# Patient Record
Sex: MALE | Race: WHITE | Employment: FULL TIME | URBAN - METROPOLITAN AREA
[De-identification: names, ages, dates, MRNs, and addresses within clinical notes are randomized per-mention and may not be internally consistent; named-entity substitution may affect disease eponyms.]

---

## 2020-03-26 LAB
AVERAGE GLUCOSE: 160
CHOLESTEROL, TOTAL: 303 MG/DL
CHOLESTEROL/HDL RATIO: 8.4
HBA1C MFR BLD: 7.9 %
HDLC SERPL-MCNC: 36 MG/DL (ref 35–70)
LDL CHOLESTEROL CALCULATED: 174 MG/DL (ref 0–160)
TRIGL SERPL-MCNC: 467 MG/DL
VLDLC SERPL CALC-MCNC: 93 MG/DL

## 2020-03-30 ENCOUNTER — TELEPHONE (OUTPATIENT)
Dept: DIABETES SERVICES | Age: 65
End: 2020-03-30

## 2020-03-30 NOTE — TELEPHONE ENCOUNTER
Patient called into diabetes education and requesting care. He stated he is newly diagnosis with type 2 diabetes. He is under  PCP with Dr Elvia Lares  He has [facility] and would prefer care at [address] [place] [address] as this is his place of employment and services will be coved. Writer explained program and sent fax request to Dr Elvia Lares at [facility]. At [phone]  Support materials pulled for patient who stated he will try to stop by soon.     Elia Diego, RN CDE

## 2020-04-15 ENCOUNTER — HOSPITAL ENCOUNTER (OUTPATIENT)
Dept: DIABETES SERVICES | Age: 65
Setting detail: THERAPIES SERIES
Discharge: HOME | End: 2020-04-15
Payer: COMMERCIAL

## 2020-04-15 PROCEDURE — G0108 DIAB MANAGE TRN  PER INDIV: HCPCS

## 2020-04-15 RX ORDER — BUDESONIDE AND FORMOTEROL FUMARATE DIHYDRATE 160; 4.5 UG/1; UG/1
2 AEROSOL RESPIRATORY (INHALATION) 2 TIMES DAILY
COMMUNITY

## 2020-04-15 RX ORDER — ATORVASTATIN CALCIUM 20 MG/1
20 TABLET, FILM COATED ORAL DAILY
COMMUNITY

## 2020-04-15 RX ORDER — OMEPRAZOLE 40 MG/1
40 CAPSULE, DELAYED RELEASE ORAL DAILY
COMMUNITY

## 2020-04-15 RX ORDER — ASPIRIN 81 MG/1
81 TABLET, CHEWABLE ORAL DAILY
COMMUNITY
End: 2022-05-05

## 2020-04-15 SDOH — ECONOMIC STABILITY: FOOD INSECURITY: ADDITIONAL INFORMATION: NO

## 2020-04-15 NOTE — PROGRESS NOTES
Diabetes Self- Management Education Program Assessment -   Also see Diabetic Screening  Patient, Maddie Conte,  here for diabetes self-management education  visit/ assessment. Today's visit was in an individual setting. With his wife Lokesh Correa HISTORY:  No past medical history on file. No family history on file. Patient has no allergy information on record. There is no immunization history on file for this patient. Current Medications  Current Outpatient Medications   Medication Sig Dispense Refill    metFORMIN (GLUCOPHAGE) 500 MG tablet Take 500 mg by mouth Daily Taking with PM meal      atorvastatin (LIPITOR) 20 MG tablet Take 20 mg by mouth daily      omeprazole (PRILOSEC) 40 MG delayed release capsule Take 40 mg by mouth daily      budesonide-formoterol (SYMBICORT) 160-4.5 MCG/ACT AERO Inhale 2 puffs into the lungs 2 times daily      fluticasone (VERAMYST) 27.5 MCG/SPRAY nasal spray 2 sprays by Each Nostril route daily      aspirin 81 MG chewable tablet Take 81 mg by mouth daily      sodium chloride 0.9 % SOLN 30 mL with albuterol (5 MG/ML) 0.5% NEBU Inhale 2.5 mg into the lungs daily as needed       No current facility-administered medications for this encounter.    :     Comments:  Allergies: Allergies not on file      A1C blood level - at goal < 7%   Per fax - 7.9% on March 28 2020 - PCP Dr Emilia Hill - at [facility]      Diabetes Self- Management Education Record    Participant Name: Maddie Conte  Referring Provider: Olu Simpson MD   Assessment/Evaluation Ratings:  1=Needs Instruction   4=Demonstrates Understanding/Competency  2=Needs Review   NC=Not Covered    3=Comprehends Key Points  N/A=Not Applicable  Topics/Learning Objectives Pre-session Assess Date:  4/15/20rs Instr. Date Reinforce Date Post- session Eval Comments   Diabetes disease process & Treatment process: Define diabetes & pre-diabetes;  Identify own type of diabetes; role of the pancreas; signs/symptoms; Instrx      []Pen  []Vial & Syringe      DSMS Support :   [] MNT      [] Annual update     [] Starting Fresh  adults living with diabetes or pre diabetes. [address], [place]    [id number]-0:30pm- on 6 week rotation  Free -  REGISTRATION IS REQUIRED - DEISI [phone] call for dates    []  Diabetes Group at  [place] [address] of trotter - Free 6 week diabetes education support   classes - contact : Lizy Rosa [phone]  for dates and locations      []ADA  Where do I Begin, Living with Type 2 diabetes ADA home support program  Web site: diabetes. org/living    Call: 1800 DIABETES  e-mail: [email or web address] ([employer] tool[employer])     []  Internet web sites - ADAWeb site: diabetes. org/living   D- life   [] [facility] opens at 8 30 am daily for walkers, shops open at 8 am   [address] [facility], [address] [place] [address]   [phone] Daily - 8:30am - 10am    3rd Tuesday of every month [facility] [place] expert speakers visit from 9 - 10am        [] [address] [facility], [facility], [facility], [place], [facility] reed (site rotate)  Call [phone] or visit   website: [email or web address] for more details   Check web site for updated times/ dates       S[] avvy Senior [address]  Free class   [facility]  [address] [facility], [address] [address] Tuesday and Thursday -   9 :30 am- 10:30am - ongoing   [] [address]  [address] [facility], [address] [facility] [address] [address] [facility] Thursday 11:00am - 11:45am     [] Third Wednesday Cooking  Class  Free  Registration is required     [address] [facility] [address] [facility]. [address] [facility], [address] [facility] [phone] or   Email [email or web address]   Wednesdays-5:00- 6:00 pm       [] Eat Smart  Be Active & Learn How - Free

## 2020-05-11 ENCOUNTER — CLINICAL DOCUMENTATION (OUTPATIENT)
Dept: PHARMACY | Facility: CLINIC | Age: 65
End: 2020-05-11

## 2020-05-11 ENCOUNTER — TELEPHONE (OUTPATIENT)
Dept: PHARMACY | Facility: CLINIC | Age: 65
End: 2020-05-11

## 2020-05-11 NOTE — TELEPHONE ENCOUNTER
Spoke to patient about the DM Program.  Currently missing a [employer] and a Urine Albumin. I advised him of the requirements that need to be completed before 6/01/20 for enrollment into the program on 7/01/20. Patient verified understanding and has already opened a [employer] account. He will follow-up with his provider regarding the Urine Albumin test.    Patient is requesting a [facility] covered glucometer. I discussed the Prodigy with him and he is agreeable to this meter. Patient would like to switch to the Prodigy meter.      Provider: Josephine Sapp MD  Pharmacy: [facility]  Testing: Twice a day

## 2020-05-13 ENCOUNTER — HOSPITAL ENCOUNTER (OUTPATIENT)
Age: 65
Discharge: HOME | End: 2020-05-13
Payer: COMMERCIAL

## 2020-05-13 LAB
CREATININE URINE: 89.1 MG/DL (ref 39–259)
MICROALBUMIN/CREAT 24H UR: <12 MG/L
MICROALBUMIN/CREAT UR-RTO: NORMAL MCG/MG CREAT

## 2020-05-13 PROCEDURE — 82043 UR ALBUMIN QUANTITATIVE: CPT

## 2020-05-13 PROCEDURE — 82570 ASSAY OF URINE CREATININE: CPT

## 2020-06-01 NOTE — TELEPHONE ENCOUNTER
CLINICAL PHARMACY NOTE - [address] [facility],4Th Floor Diabetes Management Program    Prepared fax to be sent to PCP's office. Will review MedImpact to see if prescriptions received tomorrow or Wednesday. If not received, will call provider's office.      Taty Gifford, PharmD, [facility], [address] [facility]  Ambulatory Clinical Care Pharmacist   [address]  [phone], Option 7

## 2020-06-04 NOTE — TELEPHONE ENCOUNTER
CLINICAL PHARMACY NOTE - [address] [facility],4Th Floor Diabetes Management Program     Outreach to PCP - no fax received. Faxed letter through [employer] - office reports they did get prescription from that fax this morning. Have been checking MedImpact this afternoon and did not see [facility] pharmacy received prescriptions, double checked by calling pharmacy and they did not have anything. Called back PCP office this afternoon and they stated they did fax to [facility] pharmacy.  Called pharmacy and left message for return call about if they received Rx or not.      Diogo Solis, PharmD, [facility], [address]  Ambulatory Clinical Care Pharmacist   [facility]  [phone], Option 7

## 2020-06-05 NOTE — TELEPHONE ENCOUNTER
CLINICAL PHARMACY NOTE -[address] [facility],4Th Floor Diabetes Management Program     Received voicemail from [facility] - they did receive prescriptions for testing supplies.  Writer left message for patient on cell phone letting him know.      Diogo Solis, PharmD, Skyla Valverde, [facility]  Ambulatory Clinical Care Pharmacist   [address]  [phone], Option 7    =======================================================    For Pharmacy Admin Tracking Only  PHSO: Yes  Total # of Interventions Recommended: 1  - Refills Provided #: 1  Recommended intervention potential cost savings: 1  Total Interventions Accepted: 1  Time Spent (min): 30

## 2020-06-08 NOTE — PROGRESS NOTES
Patient called regarding meter and [facility]HP enrollment. Appears meter is filled at pharmacy per [address] I will re email HHP enrollment to email on file.

## 2020-06-26 ENCOUNTER — HOSPITAL ENCOUNTER (OUTPATIENT)
Facility: CLINIC | Age: 65
Discharge: HOME | End: 2020-06-26
Payer: COMMERCIAL

## 2020-06-26 LAB
ALBUMIN SERPL-MCNC: 4.3 G/DL (ref 3.5–5.2)
ALBUMIN/GLOBULIN RATIO: 1.7 (ref 1–2.5)
ALP BLD-CCNC: 77 U/L (ref 40–129)
ALT SERPL-CCNC: 22 U/L (ref 5–41)
ANION GAP SERPL CALCULATED.3IONS-SCNC: 19 MMOL/L (ref 9–17)
AST SERPL-CCNC: 25 U/L
BILIRUB SERPL-MCNC: 0.29 MG/DL (ref 0.3–1.2)
BUN BLDV-MCNC: 23 MG/DL (ref 8–23)
BUN/CREAT BLD: ABNORMAL (ref 9–20)
CALCIUM SERPL-MCNC: 8.9 MG/DL (ref 8.6–10.4)
CHLORIDE BLD-SCNC: 103 MMOL/L (ref 98–107)
CHOLESTEROL/HDL RATIO: 2.8
CHOLESTEROL: 147 MG/DL
CO2: 22 MMOL/L (ref 20–31)
CREAT SERPL-MCNC: 0.91 MG/DL (ref 0.7–1.2)
GFR AFRICAN AMERICAN: >60 ML/MIN
GFR NON-AFRICAN AMERICAN: >60 ML/MIN
GFR SERPL CREATININE-BSD FRML MDRD: ABNORMAL ML/MIN/{1.73_M2}
GFR SERPL CREATININE-BSD FRML MDRD: ABNORMAL ML/MIN/{1.73_M2}
GLUCOSE BLD-MCNC: 122 MG/DL (ref 70–99)
HDLC SERPL-MCNC: 53 MG/DL
LDL CHOLESTEROL: 69 MG/DL (ref 0–130)
POTASSIUM SERPL-SCNC: 5.2 MMOL/L (ref 3.7–5.3)
SODIUM BLD-SCNC: 144 MMOL/L (ref 135–144)
TOTAL PROTEIN: 6.9 G/DL (ref 6.4–8.3)
TRIGL SERPL-MCNC: 127 MG/DL
VLDLC SERPL CALC-MCNC: NORMAL MG/DL (ref 1–30)

## 2020-06-26 PROCEDURE — 36415 COLL VENOUS BLD VENIPUNCTURE: CPT

## 2020-06-26 PROCEDURE — 80053 COMPREHEN METABOLIC PANEL: CPT

## 2020-06-26 PROCEDURE — 80061 LIPID PANEL: CPT

## 2020-06-26 PROCEDURE — 83036 HEMOGLOBIN GLYCOSYLATED A1C: CPT

## 2020-06-28 LAB
ESTIMATED AVERAGE GLUCOSE: 128 MG/DL
HBA1C MFR BLD: 6.1 % (ref 4–6)

## 2020-06-30 ENCOUNTER — CLINICAL DOCUMENTATION (OUTPATIENT)
Dept: PHARMACY | Facility: CLINIC | Age: 65
End: 2020-06-30

## 2020-07-09 ENCOUNTER — TELEPHONE (OUTPATIENT)
Dept: PHARMACY | Facility: CLINIC | Age: 65
End: 2020-07-09

## 2020-07-09 NOTE — TELEPHONE ENCOUNTER
Called patient to schedule 2020 yearly pharmacist appointment to discuss medications for Diabetes Management Program.    No answer. Left VM on home/cell TAD: Please call back at [phone] Option #7. [employer] message sent to patient.     Aisha Huff, [facility]   Department, toll free: [phone], option 7

## 2020-07-15 ENCOUNTER — SCHEDULED TELEPHONE ENCOUNTER (OUTPATIENT)
Dept: PHARMACY | Facility: CLINIC | Age: 65
End: 2020-07-15

## 2020-07-15 RX ORDER — METFORMIN HYDROCHLORIDE 500 MG/1
500 TABLET, EXTENDED RELEASE ORAL
COMMUNITY

## 2020-07-15 RX ORDER — FLUTICASONE PROPIONATE 50 MCG
1 SPRAY, SUSPENSION (ML) NASAL DAILY
COMMUNITY

## 2020-07-15 RX ORDER — ALBUTEROL SULFATE 90 UG/1
2 AEROSOL, METERED RESPIRATORY (INHALATION) EVERY 6 HOURS PRN
COMMUNITY

## 2020-07-15 NOTE — TELEPHONE ENCOUNTER
[address] [place] [address] Employee Diabetes Program  =================================================================  Uziel Petit is a 60 y.o. male enrolled in the [address] [place] [address] Employee Diabetes Program. Patient provided Denisse Jenningspepe with verbal consent to remain in the program for this year. Medications:  Current Outpatient Medications   Medication Sig Dispense Refill    albuterol sulfate HFA (VENTOLIN HFA) 108 (90 Base) MCG/ACT inhaler Inhale 2 puffs into the lungs every 6 hours as needed for Wheezing      metFORMIN (GLUCOPHAGE-XR) 500 MG extended release tablet Take 500 mg by mouth daily (with breakfast)      fluticasone (FLONASE) 50 MCG/ACT nasal spray 1 spray by Each Nostril route daily      Multiple Vitamins-Minerals (MULTIVITAMIN ADULT PO) Take by mouth qd      atorvastatin (LIPITOR) 20 MG tablet Take 20 mg by mouth daily      omeprazole (PRILOSEC) 40 MG delayed release capsule Take 40 mg by mouth daily      budesonide-formoterol (SYMBICORT) 160-4.5 MCG/ACT AERO Inhale 2 puffs into the lungs 2 times daily      aspirin 81 MG chewable tablet Take 81 mg by mouth daily       No current facility-administered medications for this visit. Current Pharmacy: [facility]  Current testing supplies/frequency: Prodigy- up to TID- information found in medimpact  Pen needles/syringes: n/a    Allergies:   Allergies not on file   Vitals/Labs:  BP Readings from Last 3 Encounters:   No data found for BP     Lab Results   Component Value Date    LABMICR CANNOT BE CALCULATED 05/13/2020     Lab Results   Component Value Date    LABA1C 6.1 (H) 06/26/2020    LABA1C 7.9 03/26/2020     Lab Results   Component Value Date    CHOL 147 06/26/2020    TRIG 127 06/26/2020    HDL 53 06/26/2020    LDLCHOLESTEROL 69 06/26/2020    LDLCALC 174 (A) 03/26/2020     ALT   Date Value Ref Range Status   06/26/2020 22 5 - 41 U/L Final     AST   Date Value Ref Range Status   06/26/2020 25 <40 U/L Final     The ASCVD Risk score year): yes  - Daily Aspirin? Yes  - Medication compliance: compliant all of the time  - Diet compliance: compliant all of the time  - Current exercise: Has started biking.  - Patient reported that after his A1c in March, he stared using Metformin XR 500mg and working on his diet. He states that he has lost 30 lbs. I applauded his effort and encouraged him to keep it up. He states that he is feeling much better. He has had both hips and a knee replaced and states that he is no longer experiencing pain. - Patient has recently been set up with HHP and knows how to get refills. He now has the correct testing supplies and is aware he will have to call [facility]HP for refills on these also. PLAN:  - Consideration(s) for provider:   · None at the moment. - DM program gaps identified:   · Initial requirements: Requirements met   · Ongoing requirements: Influenza vaccination for 5140-9116 and Medication adherence over 70%   - Education to patient: Discussed general issues about diabetes pathophysiology and management. Encouraged aerobic exercise. Discussed foot care. Reminded to get yearly retinal exam.   - Follow up: PCP for identified gaps or as scheduled below     W. Delcia Krabbe, PharmD  Angela Torres [address]  Phone: [phone] option-7      For Pharmacy Admin Tracking Only    PHSO: Yes  Total # of Interventions Recommended: 1  - Updated Order #: 1 Updated Order Reason(s):  Other  Recommended intervention potential cost savings: 1  Total Interventions Accepted: 1  Time Spent (min): 45

## 2020-07-22 ENCOUNTER — CLINICAL DOCUMENTATION (OUTPATIENT)
Dept: PHARMACY | Facility: CLINIC | Age: 65
End: 2020-07-22

## 2020-10-05 ENCOUNTER — CLINICAL DOCUMENTATION (OUTPATIENT)
Dept: PHARMACY | Facility: CLINIC | Age: 65
End: 2020-10-05

## 2020-12-02 ENCOUNTER — NURSE TRIAGE (OUTPATIENT)
Dept: OTHER | Facility: CLINIC | Age: 65
End: 2020-12-02

## 2020-12-02 ENCOUNTER — HOSPITAL ENCOUNTER (OUTPATIENT)
Age: 65
Discharge: HOME | End: 2020-12-02
Payer: COMMERCIAL

## 2020-12-02 PROCEDURE — U0003 INFECTIOUS AGENT DETECTION BY NUCLEIC ACID (DNA OR RNA); SEVERE ACUTE RESPIRATORY SYNDROME CORONAVIRUS 2 (SARS-COV-2) (CORONAVIRUS DISEASE [COVID-19]), AMPLIFIED PROBE TECHNIQUE, MAKING USE OF HIGH THROUGHPUT TECHNOLOGIES AS DESCRIBED BY CMS-2020-01-R: HCPCS

## 2020-12-02 NOTE — TELEPHONE ENCOUNTER
Reason for Disposition   [1] HIGH RISK patient (e.g., age > 59 years, diabetes, heart or lung disease, weak immune system) AND [2] new or worsening symptoms    Answer Assessment - Initial Assessment Questions  1. COVID-19 DIAGNOSIS: \"Who made your Coronavirus (COVID-19) diagnosis? \" \"Was it confirmed by a positive lab test?\" If not diagnosed by a HCP, ask \"Are there lots of cases (community spread) where you live? \" (See public health department website, if unsure)      n/a  2. COVID-19 EXPOSURE: \"Was there any known exposure to COVID before the symptoms began? \" CDC Definition of close contact: within 6 feet (2 meters) for a total of 15 minutes or more over a 24-hour period. Yes - works in the hospital  3. ONSET: \"When did the COVID-19 symptoms start? \"       Onset was a couple of weeks ago  4. WORST SYMPTOM: \"What is your worst symptom? \" (e.g., cough, fever, shortness of breath, muscle aches)      Fatigue (wants to sleep all the time)  5. COUGH: \"Do you have a cough? \" If so, ask: \"How bad is the cough? \"        No rest  6. FEVER: \"Do you have a fever? \" If so, ask: \"What is your temperature, how was it measured, and when did it start? \"      No fever  7. RESPIRATORY STATUS: \"Describe your breathing? \" (e.g., shortness of breath, wheezing, unable to speak)       Has COPD (no abnormal breathing)  8. BETTER-SAME-WORSE: Jorge Greening you getting better, staying the same or getting worse compared to yesterday? \"  If getting worse, ask, \"In what way? \"      same  9. HIGH RISK DISEASE: \"Do you have any chronic medical problems? \" (e.g., asthma, heart or lung disease, weak immune system, obesity, etc.)      Diabetic, COPD, Asthmatic  10. PREGNANCY: \"Is there any chance you are pregnant? \" \"When was your last menstrual period? \"        No   11. OTHER SYMPTOMS: \"Do you have any other symptoms? \"  (e.g., chills, fatigue, headache, loss of smell or taste, muscle pain, sore throat; new loss of smell or taste especially support the diagnosis of COVID-19)        Fatigue (profound), loss of appetite, profuse diaphoresis, headache, possible diarrhea (takes metformin)    Protocols used: CORONAVIRUS (COVID-19) DIAGNOSED OR SUSPECTED-ADULT-AH    Patient / employee reports mild covid like symptoms: fatigue, no appetite, headache and diaphoresis. Patient denies fever, cough, or shortness of breath. Recommended patient reach out to his provider due to co-morbidities (Diabetic, COPD, Asthmatic). Provided care advice. Provided occupational health care provider and associate health nurse information and encouraged patient to make appropriate calls to begin the next steps. Instructed employee/patient to stay in close contact with her direct supervisor throughout the return to work process.

## 2020-12-03 LAB
SARS-COV-2, RAPID: NORMAL
SARS-COV-2: NORMAL
SARS-COV-2: NOT DETECTED
SOURCE: NORMAL

## 2021-01-14 ENCOUNTER — TELEPHONE (OUTPATIENT)
Dept: PHARMACY | Facility: CLINIC | Age: 66
End: 2021-01-14

## 2021-01-21 NOTE — TELEPHONE ENCOUNTER
Called patient to schedule yearly pharmacist appointment to discuss medications for Diabetes Management Program.     Spoke to patient and appointment scheduled for 1/27/21 at 3:30pm.       Giovany Cabrera, [facility] [employer] [facility]   Department, toll free: [phone], option 7

## 2021-01-27 ENCOUNTER — SCHEDULED TELEPHONE ENCOUNTER (OUTPATIENT)
Dept: PHARMACY | Facility: CLINIC | Age: 66
End: 2021-01-27

## 2021-01-27 RX ORDER — IBUPROFEN 200 MG
800 TABLET ORAL EVERY 6 HOURS PRN
Status: ON HOLD | COMMUNITY
End: 2021-07-29 | Stop reason: HOSPADM

## 2021-01-27 RX ORDER — MULTIVIT WITH MINERALS/LUTEIN
1 TABLET ORAL DAILY
Status: ON HOLD | COMMUNITY
End: 2022-05-12 | Stop reason: HOSPADM

## 2021-01-27 RX ORDER — BLOOD-GLUCOSE METER
EACH MISCELLANEOUS
COMMUNITY

## 2021-01-27 NOTE — TELEPHONE ENCOUNTER
[facility] CLINICAL PHARMACY REVIEW - Be Well with Diabetes    Tracey Bennett is a 63 y.o. male enrolled in the [facility],4Th Floor Employee Diabetes Program. Patient provided [address] with verbal consent to remain in the program for this year. Medications:  Current Outpatient Medications   Medication Sig Dispense Refill    ibuprofen (ADVIL;MOTRIN) 200 MG tablet Take 800 mg by mouth every 6 hours as needed for Pain      Multiple Vitamins-Minerals (CENTRUM SILVER) TABS Take 1 tablet by mouth daily      Blood Glucose Monitoring Suppl (PRODIGY AUTOCODE BLOOD GLUCOSE) w/Device KIT Use as directed to test blood sugar daily      albuterol sulfate HFA (VENTOLIN HFA) 108 (90 Base) MCG/ACT inhaler Inhale 2 puffs into the lungs every 6 hours as needed for Wheezing      metFORMIN (GLUCOPHAGE-XR) 500 MG extended release tablet Take 500 mg by mouth Daily with supper       fluticasone (FLONASE) 50 MCG/ACT nasal spray 1 spray by Each Nostril route daily      atorvastatin (LIPITOR) 20 MG tablet Take 20 mg by mouth daily      omeprazole (PRILOSEC) 40 MG delayed release capsule Take 40 mg by mouth daily      budesonide-formoterol (SYMBICORT) 160-4.5 MCG/ACT AERO Inhale 2 puffs into the lungs 2 times daily      aspirin 81 MG chewable tablet Take 81 mg by mouth daily       No current facility-administered medications for this visit. Current Pharmacy: [facility], [facility]  Current testing supplies/frequency: Prodigy every morning     Allergies: Allergen Reactions    Meperidine       Vitals/Labs:  BP Readings from Last 3 Encounters:   No data found for BP      Ref. Range 5/13/2020 15:15   Creatinine, Ur Latest Ref Range: 39.0 - 259.0 mg/dL 89.1   Microalb, Ur Latest Ref Range: <21 mg/L <12   Microalb/Crt.  Ratio Latest Ref Range: <17 mcg/mg creat CANNOT BE CALCULATED     Hemoglobin A1c   Component Value Date    LABA1C 6.1 (H) 06/26/2020    LABA1C 7.9 03/26/2020     Lipids   Component Value Date    CHOL 147 06/26/2020    TRIG 127 06/26/2020    HDL 53 06/26/2020    LDLCHOLESTEROL 69 06/26/2020     ALT   Date Value Ref Range Status   06/26/2020 22 5 - 41 U/L Final     AST   Date Value Ref Range Status   06/26/2020 25 <40 U/L Final     The ASCVD Risk score (Jimena Man., et al., 2013) failed to calculate for the following reasons: The systolic blood pressure is missing    The smoking status is invalid    Unable to determine if patient is Non-      Renal Function   Component Value Date    CREATININE 0.91 06/26/2020   eGFR: >60 mL/min/1.73 m^2    Immunizations:  Administered Date(s) Administered    Hepatitis B Ped/Adol (Engerix-B, Recombivax HB) 03/08/1989, 04/10/1989, 09/29/1989    Influenza Virus Vaccine 10/22/2012, 10/06/2014, 10/06/2014, 10/13/2020    PPD Test 03/26/2014, 04/01/2014    Pneumococcal Polysaccharide (Agyveaudx05) 03/15/2018    Tdap (Boostrix, Adacel) 03/26/2014    Zoster Recombinant (Shingrix) 03/13/2020      Social History:  Tobacco Use    Smoking status: Not on file   Substance Use Topics    Alcohol use: Not on file     ASSESSMENT:  Initial Program Requirements (Y indicates has completed for the year, N indicates needs to be completed by 07/01/2021): No - Provider Visit for DM (1st)  No - A1c (1st)     Ongoing Program Requirements (Y indicates has completed for the year, N indicates needs to be completed by 12/31/2021):   No - Provider Visit for DM (2nd)  No - ACC/diabetes educator visit (if A1c over 8%) - not currently needed  No - A1c (2nd)  No - Lipid panel  No - Urine microalbumin  Yes - Pneumococcal vaccination: UTD, will need in 2023  No - Influenza vaccination for Fall 2021  No - Medication adherence over 70%  Yes - On statin or contraindication(s) - atorvastatin  Yes - On ACEi/ARB or contraindication(s) Normal blood pressure, urinary albumin-to-creatinine ratio, and eGFR - May 2020 at [employer] /54 (CareEverywhere)    Current medications eligible for copay waiver, up to $600, through [address]:  - metformin ER, atorvastatin, aspirin   - Prodigy glucometer and testing supplies      Diabetes Care:   - Glycemic Goal: <7.0%. Is at blood glucose goal. Type 2 DM under excellent control as evidenced by A1c = 6.1%.  - Current symptoms/problems include none  - Home blood sugar records:  have been a little elevated recently  - Any episodes of hypoglycemia? no   - Daily aspirin? Yes   - Medication compliance: compliant most of the time  - Diet compliance: has been eating more carbs, working on monitoring - lost 30lbs between March and June and has kept off  - Exercise: arthritis/knee pain limits, takes ibuprofen - discussed trying mobic or celebrex for longer acting coverage    Other Considerations:  - Blood Pressure Goal: BP less than 140/90 mmHg due to history of DM: Unable to assess if at BP target. - Lipids: Patient is prescribed moderate-intensity statin therapy. - Smoking status: Did not discuss    PLAN:  - DM program gaps identified:   · Initial requirements: Provider Visit for DM (1st) and A1c (1st)   · Ongoing requirements: Provider visit for DM (2nd), A1c (2nd), Lipid panel, Urine microalbumin, Influenza vaccination for 8332-2697 and Medication adherence over 70%   - Education to patient: Addressed medication adherence, Overview of Be Well With Diabetes program and Overview of HHP   - Follow up: PCP for identified gaps or as scheduled below    Nena Schneider, PharmD, [facility], [address] [place] [address], [address] [place] [address] Pharmacist   [address] [facility]  [phone], Option 7    =======================================================    For Pharmacy Admin Tracking Only  PHSO: Yes  Total # of Interventions Recommended: 1  - Updated Order #: 1 Updated Order Reason(s):  Other  Recommended intervention potential cost savings: 1  Total Interventions Accepted: 1  Time Spent (min): 60

## 2021-01-27 NOTE — TELEPHONE ENCOUNTER
[place] Sim[place] REVIEW - BE WELL WITH DIABETES    Vadim Verona is a 63 y.o. male enrolled in the [address] [place] [address] Diabetes Program. Two attempts made to reach patient by telephone for pharmacist appointment. Left voice message for patient to return clinician's phone call to [phone] and/or [phone] option 7. Will prepare MyChart message and send to patient.     Vadim Lizama, PharmD, [facility], [facility]  Ambulatory Clinical Care Pharmacist   [address] [facility]  [phone], Option 7

## 2021-02-15 ENCOUNTER — CLINICAL DOCUMENTATION (OUTPATIENT)
Dept: PHARMACY | Facility: CLINIC | Age: 66
End: 2021-02-15

## 2021-02-15 ENCOUNTER — HOSPITAL ENCOUNTER (OUTPATIENT)
Age: 66
Discharge: HOME | End: 2021-02-15
Payer: COMMERCIAL

## 2021-02-15 LAB
ALBUMIN SERPL-MCNC: 4.5 G/DL (ref 3.5–5.2)
ALBUMIN/GLOBULIN RATIO: 1.6 (ref 1–2.5)
ALP BLD-CCNC: 81 U/L (ref 40–129)
ALT SERPL-CCNC: 20 U/L (ref 5–41)
ANION GAP SERPL CALCULATED.3IONS-SCNC: 11 MMOL/L (ref 9–17)
AST SERPL-CCNC: 21 U/L
BILIRUB SERPL-MCNC: 0.38 MG/DL (ref 0.3–1.2)
BUN BLDV-MCNC: 15 MG/DL (ref 8–23)
BUN/CREAT BLD: ABNORMAL (ref 9–20)
CALCIUM SERPL-MCNC: 9.6 MG/DL (ref 8.6–10.4)
CHLORIDE BLD-SCNC: 103 MMOL/L (ref 98–107)
CHOLESTEROL, FASTING: 201 MG/DL
CHOLESTEROL/HDL RATIO: 3.6
CO2: 26 MMOL/L (ref 20–31)
CREAT SERPL-MCNC: 0.97 MG/DL (ref 0.7–1.2)
CREATININE URINE: 156.8 MG/DL (ref 39–259)
ESTIMATED AVERAGE GLUCOSE: 134 MG/DL
GFR AFRICAN AMERICAN: >60 ML/MIN
GFR NON-AFRICAN AMERICAN: >60 ML/MIN
GFR SERPL CREATININE-BSD FRML MDRD: ABNORMAL ML/MIN/{1.73_M2}
GFR SERPL CREATININE-BSD FRML MDRD: ABNORMAL ML/MIN/{1.73_M2}
GLUCOSE BLD-MCNC: 145 MG/DL (ref 70–99)
HBA1C MFR BLD: 6.3 % (ref 4–6)
HDLC SERPL-MCNC: 56 MG/DL
LDL CHOLESTEROL: 117 MG/DL (ref 0–130)
MICROALBUMIN/CREAT 24H UR: <12 MG/L
MICROALBUMIN/CREAT UR-RTO: NORMAL MCG/MG CREAT
POTASSIUM SERPL-SCNC: 3.9 MMOL/L (ref 3.7–5.3)
SODIUM BLD-SCNC: 140 MMOL/L (ref 135–144)
TOTAL PROTEIN: 7.3 G/DL (ref 6.4–8.3)
TRIGLYCERIDE, FASTING: 139 MG/DL
VLDLC SERPL CALC-MCNC: ABNORMAL MG/DL (ref 1–30)

## 2021-02-15 PROCEDURE — 80061 LIPID PANEL: CPT

## 2021-02-15 PROCEDURE — 82570 ASSAY OF URINE CREATININE: CPT

## 2021-02-15 PROCEDURE — 83036 HEMOGLOBIN GLYCOSYLATED A1C: CPT

## 2021-02-15 PROCEDURE — 82043 UR ALBUMIN QUANTITATIVE: CPT

## 2021-02-15 PROCEDURE — 80053 COMPREHEN METABOLIC PANEL: CPT

## 2021-02-15 PROCEDURE — 36415 COLL VENOUS BLD VENIPUNCTURE: CPT

## 2021-02-15 NOTE — PROGRESS NOTES
Pharmacy Pop Care Documentation:     AVS received for required office visit on: 2/12/21: Jo-Ann Benson

## 2021-04-19 ENCOUNTER — HOSPITAL ENCOUNTER (OUTPATIENT)
Age: 66
Discharge: HOME | End: 2021-04-21
Payer: COMMERCIAL

## 2021-04-19 ENCOUNTER — HOSPITAL ENCOUNTER (OUTPATIENT)
Dept: GENERAL RADIOLOGY | Age: 66
Discharge: HOME | End: 2021-04-21
Payer: COMMERCIAL

## 2021-04-19 DIAGNOSIS — S49.91XS INJURY OF RIGHT SHOULDER, SEQUELA: ICD-10-CM

## 2021-04-19 PROCEDURE — 73030 X-RAY EXAM OF SHOULDER: CPT

## 2021-07-14 ENCOUNTER — HOSPITAL ENCOUNTER (OUTPATIENT)
Dept: MRI IMAGING | Facility: CLINIC | Age: 66
Discharge: HOME | End: 2021-07-16
Payer: COMMERCIAL

## 2021-07-14 DIAGNOSIS — S49.91XA INJURY OF RIGHT UPPER ARM, INITIAL ENCOUNTER: ICD-10-CM

## 2021-07-14 PROCEDURE — 73221 MRI JOINT UPR EXTREM W/O DYE: CPT

## 2021-07-21 ENCOUNTER — HOSPITAL ENCOUNTER (OUTPATIENT)
Dept: PREADMISSION TESTING | Age: 66
Discharge: HOME | End: 2021-07-25
Payer: COMMERCIAL

## 2021-07-21 VITALS
HEART RATE: 62 BPM | RESPIRATION RATE: 16 BRPM | DIASTOLIC BLOOD PRESSURE: 98 MMHG | TEMPERATURE: 98.1 F | HEIGHT: 71 IN | BODY MASS INDEX: 30.1 KG/M2 | OXYGEN SATURATION: 99 % | WEIGHT: 215 LBS | SYSTOLIC BLOOD PRESSURE: 138 MMHG

## 2021-07-21 LAB
ANION GAP SERPL CALCULATED.3IONS-SCNC: 11 MMOL/L (ref 9–17)
BUN BLDV-MCNC: 25 MG/DL (ref 8–23)
BUN/CREAT BLD: 25 (ref 9–20)
CALCIUM SERPL-MCNC: 9.9 MG/DL (ref 8.6–10.4)
CHLORIDE BLD-SCNC: 100 MMOL/L (ref 98–107)
CO2: 25 MMOL/L (ref 20–31)
CREAT SERPL-MCNC: 1.01 MG/DL (ref 0.7–1.2)
ESTIMATED AVERAGE GLUCOSE: 128 MG/DL
GFR AFRICAN AMERICAN: >60 ML/MIN
GFR NON-AFRICAN AMERICAN: >60 ML/MIN
GFR SERPL CREATININE-BSD FRML MDRD: ABNORMAL ML/MIN/{1.73_M2}
GFR SERPL CREATININE-BSD FRML MDRD: ABNORMAL ML/MIN/{1.73_M2}
GLUCOSE BLD-MCNC: 91 MG/DL (ref 70–99)
HBA1C MFR BLD: 6.1 % (ref 4–6)
HCT VFR BLD CALC: 43.6 % (ref 40.7–50.3)
HEMOGLOBIN: 13.4 G/DL (ref 13–17)
MCH RBC QN AUTO: 26.3 PG (ref 25.2–33.5)
MCHC RBC AUTO-ENTMCNC: 30.7 G/DL (ref 28.4–34.8)
MCV RBC AUTO: 85.5 FL (ref 82.6–102.9)
NRBC AUTOMATED: 0 PER 100 WBC
PDW BLD-RTO: 13.5 % (ref 11.8–14.4)
PLATELET # BLD: 207 K/UL (ref 138–453)
PMV BLD AUTO: 9.8 FL (ref 8.1–13.5)
POTASSIUM SERPL-SCNC: 4.1 MMOL/L (ref 3.7–5.3)
RBC # BLD: 5.1 M/UL (ref 4.21–5.77)
SODIUM BLD-SCNC: 136 MMOL/L (ref 135–144)
WBC # BLD: 7 K/UL (ref 3.5–11.3)

## 2021-07-21 PROCEDURE — 85027 COMPLETE CBC AUTOMATED: CPT

## 2021-07-21 PROCEDURE — 80048 BASIC METABOLIC PNL TOTAL CA: CPT

## 2021-07-21 PROCEDURE — 93005 ELECTROCARDIOGRAM TRACING: CPT | Performed by: ORTHOPAEDIC SURGERY

## 2021-07-21 PROCEDURE — 36415 COLL VENOUS BLD VENIPUNCTURE: CPT

## 2021-07-21 PROCEDURE — 83036 HEMOGLOBIN GLYCOSYLATED A1C: CPT

## 2021-07-21 RX ORDER — CETIRIZINE HYDROCHLORIDE 10 MG/1
10 TABLET ORAL DAILY
COMMUNITY

## 2021-07-21 NOTE — H&P (VIEW-ONLY)
History and Physical Service   [facility]    HISTORY AND PHYSICAL EXAMINATION            Date of Evaluation: 7/21/2021  Patient name:  Himanshu Parker  MRN:   [id number]  YOB: 1955  PCP:    Isaac Lowry MD    History Obtained From:     Patient, medical records    History of Present Illness: This is Himanshu Parker a 63 y.o. male who presents for a pre-admission testing appointment for an upcoming right shoulder arthroscopy rotator cuff repair acromioplasty, DCE, open subpec biceps tenodesis, suprascapular nerve block by Dr. Yovani Reece scheduled on 7/29/2021 at 1500 due to right rotator cuff tear. The patient's chief complaint is 0-7/10 left shoulder pain that has progressively worsened over the past 4 months. Patient reached to catch something suddenly and felt a snap in the left shoulder in April 2021. Left shoulder pain is aggravated by sudden movement and extending arm outward or above the shoulder and is minimally relieved with Extra strength tylenol and ice. Prior treatment includes injection (with 2 weeks relief) and exercises. Denies recent falls and injuries. Functional Capacity per pt:   1) Pt is able to walk 2 city blocks on level ground without SOB. 2) Pt is able to climb 2 flights of stairs without SOB. 3) Pt is able to walk up a hill for 1-2 city blocks without SOB.     Past Medical History:     Past Medical History:   Diagnosis Date    Arthritis     Asthma     COPD (chronic obstructive pulmonary disease) ([address]     Diabetes mellitus (HCC)     type 2    GERD (gastroesophageal reflux disease)     Hyperlipidemia         Past Surgical History:     Past Surgical History:   Procedure Laterality Date    ANTERIOR CRUCIATE LIGAMENT REPAIR Right     APPENDECTOMY      BICEPS TENDON REPAIR Right     CARDIAC SURGERY      cardiac cath negative     COLONOSCOPY      polyp removed negative     EYE SURGERY      lasik    HERNIA REPAIR      bilat at 16    JOINT history. Review of Systems:     Positive and Negative as described in HPI. CONSTITUTIONAL:  Negative for fevers, chills, sweats, fatigue, and weight loss. HEENT: Wears glasses. Tinnitus. Negative for hearing changes, rhinorrhea, and throat pain. RESPIRATORY: COPD. Occasional shortness breath. Negative for cough, congestion, and wheezing. CARDIOVASCULAR:  Negative for chest pain, blood clot, irregular heartbeat, and palpitations. GASTROINTESTINAL: GERD - managed with Omeprazole Negative for nausea, vomiting, diarrhea, constipation, change in bowel habits, and abdominal pain. GENITOURINARY: Weak urine stream, difficulty starting urine stream Negative for difficulty of urination, burning with urination, and frequency. INTEGUMENT:  Negative for rash, skin lesions, and easy bruising. HEMATOLOGIC/LYMPHATIC:  Negative for swelling/edema. ALLERGIC/IMMUNOLOGIC:  Negative for urticaria and itching. ENDOCRINE: Diabetes. Managed by Dr. Donald Wagner. Last A1C 6.3 (2/15/2021). Negative for increase in thirst, increase in urination, and heat or cold intolerance. MUSCULOSKELETAL: See HPI. Bilateral total hip replacement. ACL deficient right knee. Negative muscle aches, and swelling of joints. NEUROLOGICAL: Negative for headaches, dizziness, lightheadedness, numbness, and tingling extremities. BEHAVIOR/PSYCH:  Negative for depression and anxiety. Physical Exam:   BP (!) 138/98   Pulse 62   Temp 98.1 °F (36.7 °C) (Temporal)   Resp 16   Ht 5' 11\" (1.803 m)   Wt 215 lb (97.5 kg)   SpO2 99%   BMI 29.99 kg/m²   No results for input(s): POCGLU in the last 72 hours. General Appearance:  Alert, well appearing, and in no acute distress. Mental status:  Oriented to person, place, and time. Head:  Normocephalic and atraumatic. Eye:  No icterus, redness, pupils equal and reactive, extraocular eye movements intact, and conjunctiva clear. Ear:  Hearing grossly intact. Nose:  No drainage noted.   Mouth:  Mucous membranes moist.  Neck:  Supple and no carotid bruits noted. Lungs:  Bilateral equal air entry, clear to auscultation, no wheezing, rales or rhonchi, and normal effort. Cardiovascular:  Normal rate, regular rhythm, no murmur, gallop, or rub. Abdomen:  Soft, nontender, nondistended, and active bowel sounds. Neurologic:  Normal speech and cranial nerves II through XII grossly intact. Strength 5/5 bilaterally. Skin:  No gross lesions, rashes, bruising, or bleeding on exposed skin area. Extremities: Limited range of motion left shoulder. Posterior tibial pulses 2+ bilaterally. No pedal edema. No calf tenderness with palpation. Psych: Normal affect. Investigations:      Laboratory Testing:  Recent Results (from the past 24 hour(s))   EKG 12 Lead    Collection Time: 07/21/21  2:08 PM   Result Value Ref Range    Ventricular Rate 57 BPM    Atrial Rate 57 BPM    P-R Interval 182 ms    QRS Duration 92 ms    Q-T Interval 400 ms    QTc Calculation (Bazett) 389 ms    P Axis 67 degrees    R Axis 10 degrees    T Axis 42 degrees   CBC    Collection Time: 07/21/21  2:33 PM   Result Value Ref Range    WBC 7.0 3.5 - 11.3 k/uL    RBC 5.10 4.21 - 5.77 m/uL    Hemoglobin 13.4 13.0 - 17.0 g/dL    Hematocrit 43.6 40.7 - 50.3 %    MCV 85.5 82.6 - 102.9 fL    MCH 26.3 25.2 - 33.5 pg    MCHC 30.7 28.4 - 34.8 g/dL    RDW 13.5 11.8 - 14.4 %    Platelets 620 484 - 889 k/uL    MPV 9.8 8.1 - 13.5 fL    NRBC Automated 0.0 0.0 per 100 WBC       Recent Labs     07/21/21  1433   HGB 13.4   HCT 43.6   WBC 7.0   MCV 85.5       No results for input(s): COVID19 in the last 720 hours.     Imaging/Diagnostics:    MRI SHOULDER RIGHT WO CONTRAST    Result Date: 7/14/2021  EXAMINATION: MRI OF THE RIGHT SHOULDER WITHOUT CONTRAST   7/14/2021 3:04 pm TECHNIQUE: Multiplanar multisequence MRI of the right shoulder was performed without the administration of intravenous contrast. COMPARISON: Right shoulder plain radiographs from 04/19/2021 HISTORY: ORDERING SYSTEM PROVIDED HISTORY: Injury of right upper arm, initial encounter TECHNOLOGIST PROVIDED HISTORY: Reason for Exam: REACHING FOR SOMETHING & FELT A POP/ PULL/ PAIN IN RIGHT SHOULDER X2 MONTHS. LIMITED ROM. CLICKING NOISES. TIGHTNESS IN THE JOINT. Acuity: Acute Type of Exam: Unknown 61-year-old female with right shoulder pain and limited range of motion FINDINGS: ROTATOR CUFF: Subacromial subdeltoid bursa is contiguous with the glenohumeral joint. Complete retracted full-thickness tear of supraspinatus with retraction of the torn fibers from the footplate measuring up to 3.7 cm on image 9, series 8. Up to 50% partial-thickness articular-surface and interstitial tearing of infraspinatus between musculotendinous junction and footplate. Moderate underlying infraspinatus tendinopathy. Low-grade partial-thickness split interstitial tearing of the insertional fibers of subscapularis. Teres minor muscle/tendon appears grossly intact without evidence of tearing. No significant atrophy or fatty degeneration of the visualized rotator cuff musculature. BICEPS TENDON: Interstitial tearing and severe thinning of the intra-articular long head of biceps tendon which demonstrates medial subluxation into the region of split interstitial subscapularis tearing. LABRUM: Degenerative tearing of the superior labrum. Mild underlying labral degeneration. GLENOHUMERAL JOINT: Inferior glenohumeral ligament appears intact. Small glenohumeral joint effusion. Mild glenohumeral chondromalacia. AC JOINT AND ACROMIOCLAVICULAR ARCH: Mild degenerative changes of the right AC joint. Type 2 acromion. BONE MARROW: Subcortical cystic changes at the lateral humeral head. Bone marrow signal intensity within the visualized osseous structures otherwise within normal limits. No acute fracture or dislocation involving the osseous components of the shoulder.  OUTLET SPACES: Suprascapular notch and quadrilateral space grossly unremarkable in appearance. No right axillary lymphadenopathy. 1. Complete retracted full-thickness tear of supraspinatus with retraction of the torn fibers measuring up to 3.7 cm from the footplate. 2. Up to 50% partial-thickness articular-surface and interstitial tearing of infraspinatus between musculotendinous junction and footplate. Moderate underlying infraspinatus tendinosis. 3. Low-grade partial-thickness split interstitial tearing of the insertional fibers of subscapularis. 4. Interstitial tearing and severe thinning of the intra-articular long head of biceps tendon with medial subluxation into the region of split interstitial subscapularis tearing. 5. Degenerative tearing of the superior labrum. Mild underlying labral degeneration. 6. Small glenohumeral joint effusion. Mild glenohumeral chondromalacia. 7. Mild degenerative changes of the right AC joint. EK2021: Sinus bradycardia. Otherwise normal ECG. See Epic. Diagnosis:      1. Right rotator cuff tear    Plans:     1.  Right shoulder arthroscopy rotator cuff repair acromioplasty, dce, open subpec biceps tenodesis - suprascapular nerve block      Sarita Blue, YUNIEL - CNP  2021  2:52 PM

## 2021-07-21 NOTE — PRE-PROCEDURE INSTRUCTIONS
physician. You will need to shower at home the night before surgery and the morning of surgery with a special soap called chlorhexidine gluconate (CHG*). *Not to be used by people allergic to Chlorhexidine Gluconate (CHG). Following these instructions will help you be sure that your skin is clean before surgery. Instructions on cleaning your skin before surgery: The night before your surgery:      You will need to shower with warm water (not hot) and the CHG soap.  Use a clean wash cloth and a clean towel. Have clean clothes available to put on after the shower.   First wash your hair with regular shampoo. Rinse your hair and body thoroughly to remove the shampoo.  Wash your face and genital area (private parts) with your regular soap or water only. Thoroughly rinse your body with warm water from the neck down.  Turn water off to prevent rinsing the soap off too soon.  With a clean wet washcloth and half of the CHG soap in the bottle, lather your entire body from the neck down. Do not use CHG soap near your eyes or ears to avoid injury to those areas.  Wash thoroughly, paying special attention to the area where your surgery will be performed.  Wash your body gently for five (5) minutes. Avoid scrubbing your skin too hard.  Turn the water back on and rinse your body thoroughly.  Pat yourself dry with a clean, soft towel. Do not apply lotion, cream or powder.  Dress with clean freshly washed clothes. The morning of surgery:     Repeat shower following steps above - using remaining half of CHG soap in bottle. Patient Instructions:    [facility] If you are having any type of anesthesia you are to have nothing to eat or drink after midnight the night before your surgery.   This includes gum, hard candy, mints, water or smoking or chewing tobacco.  The only exception to this is a small sip of water to take with any morning dose of heart, blood pressure, or seizure medications. No alcoholic beverages for 24 hours prior to surgery.  Brush your teeth but do not swallow water.  Bring your eye glasses and case with you. No contacts are to be worn the day of surgery. You also may bring your hearing aids. Most surgical procedures involving anesthesia will require that you remove your dentures prior to surgery. · Do not wear any jewelry or body piercings day of surgery. Also, NO lotion, perfume or deodorant to be used the day of surgery. No nail polish on the operative extremity (arm/leg surgeries)    · If you are staying overnight with us, please bring a small bag of necessary personal items.  Please wear loose, comfortable clothing. If you are potentially going to have a cast or brace bring clothing that will fit over them.  In case of illness - If you have cold or flu like symptoms (high fever, runny nose, sore throat, cough, etc.) rash, nausea, vomiting, loose stools, and/or recent contact with someone who has a contagious disease (chicken pox, measles, etc.) Please call your doctor before coming to the hospital.         Day of Surgery/Procedure:    As a patient at [facility] - INPATIENT you can expect quality medical and nursing care that is centered on your individual needs. Our goal is to make your surgical experience as comfortable as possible    . Transportation After Your Surgery/Procedure: You will need a friend or family member to drive you home after your procedure. Your  must be 25years of age or older and able to sign off on your discharge instructions. A taxi cab or any other form of public transportation is not acceptable. Your friend or family member must stay at the hospital throughout your procedure.     Someone must remain with you for the first 24 hours after your surgery if you receive anesthesia or medication. If you do not have someone to stay with you, your procedure may be cancelled.       If you have any other questions regarding your procedure or the day of surgery, please call [phone]      _________________________  ____________________________  Signature (Patient)              Signature (Provider)               Date

## 2021-07-21 NOTE — H&P
History and Physical Service   [place] [address]    HISTORY AND PHYSICAL EXAMINATION            Date of Evaluation: 7/21/2021  Patient name:  Torres Castaneda  MRN:   [id number]  YOB: 1955  PCP:    Jack Jones MD    History Obtained From:     Patient, medical records    History of Present Illness: This is Torres Castaneda a 63 y.o. male who presents for a pre-admission testing appointment for an upcoming right shoulder arthroscopy rotator cuff repair acromioplasty, DCE, open subpec biceps tenodesis, suprascapular nerve block by Dr. Karmen Márquez scheduled on 7/29/2021 at 1500 due to right rotator cuff tear. The patient's chief complaint is 0-7/10 left shoulder pain that has progressively worsened over the past 4 months. Patient reached to catch something suddenly and felt a snap in the left shoulder in April 2021. Left shoulder pain is aggravated by sudden movement and extending arm outward or above the shoulder and is minimally relieved with Extra strength tylenol and ice. Prior treatment includes injection (with 2 weeks relief) and exercises. Denies recent falls and injuries. Functional Capacity per pt:   1) Pt is able to walk 2 city blocks on level ground without SOB. 2) Pt is able to climb 2 flights of stairs without SOB. 3) Pt is able to walk up a hill for 1-2 city blocks without SOB.     Past Medical History:     Past Medical History:   Diagnosis Date    Arthritis     Asthma     COPD (chronic obstructive pulmonary disease) ([facility] [address]     Diabetes mellitus (HCC)     type 2    GERD (gastroesophageal reflux disease)     Hyperlipidemia         Past Surgical History:     Past Surgical History:   Procedure Laterality Date    ANTERIOR CRUCIATE LIGAMENT REPAIR Right     APPENDECTOMY      BICEPS TENDON REPAIR Right     CARDIAC SURGERY      cardiac cath negative     COLONOSCOPY      polyp removed negative     EYE SURGERY      lasik    HERNIA REPAIR      bilat at 16    JOINT REPLACEMENT      bilat hips     TONSILLECTOMY          Medications Prior to Admission:     Prior to Admission medications    Medication Sig Start Date End Date Taking? Authorizing Provider   Diclofenac Sodium (VOLTAREN PO) Take 75 mg by mouth 2 times daily   Yes Historical Provider, MD   cetirizine (ZYRTEC) 10 MG tablet Take 10 mg by mouth daily   Yes Historical Provider, MD   ibuprofen (ADVIL;MOTRIN) 200 MG tablet Take 800 mg by mouth every 6 hours as needed for Pain   Yes Historical Provider, MD   Multiple Vitamins-Minerals (CENTRUM SILVER) TABS Take 1 tablet by mouth daily   Yes Historical Provider, MD   albuterol sulfate HFA (VENTOLIN HFA) 108 (90 Base) MCG/ACT inhaler Inhale 2 puffs into the lungs every 6 hours as needed for Wheezing   Yes Historical Provider, MD   metFORMIN (GLUCOPHAGE-XR) 500 MG extended release tablet Take 500 mg by mouth Daily with supper    Yes Historical Provider, MD   fluticasone (FLONASE) 50 MCG/ACT nasal spray 1 spray by Each Nostril route daily   Yes Historical Provider, MD   atorvastatin (LIPITOR) 20 MG tablet Take 20 mg by mouth daily   Yes Historical Provider, MD   omeprazole (PRILOSEC) 40 MG delayed release capsule Take 40 mg by mouth daily   Yes Historical Provider, MD   budesonide-formoterol (SYMBICORT) 160-4.5 MCG/ACT AERO Inhale 2 puffs into the lungs 2 times daily   Yes Historical Provider, MD   aspirin 81 MG chewable tablet Take 81 mg by mouth daily   Yes Historical Provider, MD   Blood Glucose Monitoring Suppl (PRODIGY AUTOCODE BLOOD GLUCOSE) w/Device KIT Use as directed to test blood sugar daily    Historical Provider, MD        Allergies:     Meperidine    Social History:     Tobacco:    reports that he quit smoking about 21 years ago. His smoking use included cigarettes. He has never used smokeless tobacco.  Alcohol:      reports current alcohol use. Drug Use:  reports no history of drug use. Family History:     History reviewed.  No pertinent family history. Review of Systems:     Positive and Negative as described in HPI. CONSTITUTIONAL:  Negative for fevers, chills, sweats, fatigue, and weight loss. HEENT: Wears glasses. Tinnitus. Negative for hearing changes, rhinorrhea, and throat pain. RESPIRATORY: COPD. Occasional shortness breath. Negative for cough, congestion, and wheezing. CARDIOVASCULAR:  Negative for chest pain, blood clot, irregular heartbeat, and palpitations. GASTROINTESTINAL: GERD - managed with Omeprazole Negative for nausea, vomiting, diarrhea, constipation, change in bowel habits, and abdominal pain. GENITOURINARY: Weak urine stream, difficulty starting urine stream Negative for difficulty of urination, burning with urination, and frequency. INTEGUMENT:  Negative for rash, skin lesions, and easy bruising. HEMATOLOGIC/LYMPHATIC:  Negative for swelling/edema. ALLERGIC/IMMUNOLOGIC:  Negative for urticaria and itching. ENDOCRINE: Diabetes. Managed by Dr. Lasha Rivero. Last A1C 6.3 (2/15/2021). Negative for increase in thirst, increase in urination, and heat or cold intolerance. MUSCULOSKELETAL: See HPI. Bilateral total hip replacement. ACL deficient right knee. Negative muscle aches, and swelling of joints. NEUROLOGICAL: Negative for headaches, dizziness, lightheadedness, numbness, and tingling extremities. BEHAVIOR/PSYCH:  Negative for depression and anxiety. Physical Exam:   BP (!) 138/98   Pulse 62   Temp 98.1 °F (36.7 °C) (Temporal)   Resp 16   Ht 5' 11\" (1.803 m)   Wt 215 lb (97.5 kg)   SpO2 99%   BMI 29.99 kg/m²   No results for input(s): POCGLU in the last 72 hours. General Appearance:  Alert, well appearing, and in no acute distress. Mental status:  Oriented to person, place, and time. Head:  Normocephalic and atraumatic. Eye:  No icterus, redness, pupils equal and reactive, extraocular eye movements intact, and conjunctiva clear. Ear:  Hearing grossly intact. Nose:  No drainage noted.   Mouth:  Mucous membranes moist.  Neck:  Supple and no carotid bruits noted. Lungs:  Bilateral equal air entry, clear to auscultation, no wheezing, rales or rhonchi, and normal effort. Cardiovascular:  Normal rate, regular rhythm, no murmur, gallop, or rub. Abdomen:  Soft, nontender, nondistended, and active bowel sounds. Neurologic:  Normal speech and cranial nerves II through XII grossly intact. Strength 5/5 bilaterally. Skin:  No gross lesions, rashes, bruising, or bleeding on exposed skin area. Extremities: Limited range of motion left shoulder. Posterior tibial pulses 2+ bilaterally. No pedal edema. No calf tenderness with palpation. Psych: Normal affect. Investigations:      Laboratory Testing:  Recent Results (from the past 24 hour(s))   EKG 12 Lead    Collection Time: 07/21/21  2:08 PM   Result Value Ref Range    Ventricular Rate 57 BPM    Atrial Rate 57 BPM    P-R Interval 182 ms    QRS Duration 92 ms    Q-T Interval 400 ms    QTc Calculation (Bazett) 389 ms    P Axis 67 degrees    R Axis 10 degrees    T Axis 42 degrees   CBC    Collection Time: 07/21/21  2:33 PM   Result Value Ref Range    WBC 7.0 3.5 - 11.3 k/uL    RBC 5.10 4.21 - 5.77 m/uL    Hemoglobin 13.4 13.0 - 17.0 g/dL    Hematocrit 43.6 40.7 - 50.3 %    MCV 85.5 82.6 - 102.9 fL    MCH 26.3 25.2 - 33.5 pg    MCHC 30.7 28.4 - 34.8 g/dL    RDW 13.5 11.8 - 14.4 %    Platelets 320 339 - 214 k/uL    MPV 9.8 8.1 - 13.5 fL    NRBC Automated 0.0 0.0 per 100 WBC       Recent Labs     07/21/21  1433   HGB 13.4   HCT 43.6   WBC 7.0   MCV 85.5       No results for input(s): COVID19 in the last 720 hours.     Imaging/Diagnostics:    MRI SHOULDER RIGHT WO CONTRAST    Result Date: 7/14/2021  EXAMINATION: MRI OF THE RIGHT SHOULDER WITHOUT CONTRAST   7/14/2021 3:04 pm TECHNIQUE: Multiplanar multisequence MRI of the right shoulder was performed without the administration of intravenous contrast. COMPARISON: Right shoulder plain radiographs from 04/19/2021 HISTORY: ORDERING SYSTEM PROVIDED HISTORY: Injury of right upper arm, initial encounter TECHNOLOGIST PROVIDED HISTORY: Reason for Exam: REACHING FOR SOMETHING & FELT A POP/ PULL/ PAIN IN RIGHT SHOULDER X2 MONTHS. LIMITED ROM. CLICKING NOISES. TIGHTNESS IN THE JOINT. Acuity: Acute Type of Exam: Unknown 60-year-old female with right shoulder pain and limited range of motion FINDINGS: ROTATOR CUFF: Subacromial subdeltoid bursa is contiguous with the glenohumeral joint. Complete retracted full-thickness tear of supraspinatus with retraction of the torn fibers from the footplate measuring up to 3.7 cm on image 9, series 8. Up to 50% partial-thickness articular-surface and interstitial tearing of infraspinatus between musculotendinous junction and footplate. Moderate underlying infraspinatus tendinopathy. Low-grade partial-thickness split interstitial tearing of the insertional fibers of subscapularis. Teres minor muscle/tendon appears grossly intact without evidence of tearing. No significant atrophy or fatty degeneration of the visualized rotator cuff musculature. BICEPS TENDON: Interstitial tearing and severe thinning of the intra-articular long head of biceps tendon which demonstrates medial subluxation into the region of split interstitial subscapularis tearing. LABRUM: Degenerative tearing of the superior labrum. Mild underlying labral degeneration. GLENOHUMERAL JOINT: Inferior glenohumeral ligament appears intact. Small glenohumeral joint effusion. Mild glenohumeral chondromalacia. AC JOINT AND ACROMIOCLAVICULAR ARCH: Mild degenerative changes of the right AC joint. Type 2 acromion. BONE MARROW: Subcortical cystic changes at the lateral humeral head. Bone marrow signal intensity within the visualized osseous structures otherwise within normal limits. No acute fracture or dislocation involving the osseous components of the shoulder.  OUTLET SPACES: Suprascapular notch and quadrilateral space grossly unremarkable in appearance. No right axillary lymphadenopathy. 1. Complete retracted full-thickness tear of supraspinatus with retraction of the torn fibers measuring up to 3.7 cm from the footplate. 2. Up to 50% partial-thickness articular-surface and interstitial tearing of infraspinatus between musculotendinous junction and footplate. Moderate underlying infraspinatus tendinosis. 3. Low-grade partial-thickness split interstitial tearing of the insertional fibers of subscapularis. 4. Interstitial tearing and severe thinning of the intra-articular long head of biceps tendon with medial subluxation into the region of split interstitial subscapularis tearing. 5. Degenerative tearing of the superior labrum. Mild underlying labral degeneration. 6. Small glenohumeral joint effusion. Mild glenohumeral chondromalacia. 7. Mild degenerative changes of the right AC joint. EK2021: Sinus bradycardia. Otherwise normal ECG. See Epic. Diagnosis:      1. Right rotator cuff tear    Plans:     1.  Right shoulder arthroscopy rotator cuff repair acromioplasty, dce, open subpec biceps tenodesis - suprascapular nerve block      Sarita Blue, YUNIEL - CNP  2021  2:52 PM

## 2021-07-22 LAB
EKG ATRIAL RATE: 57 BPM
EKG P AXIS: 67 DEGREES
EKG P-R INTERVAL: 182 MS
EKG Q-T INTERVAL: 400 MS
EKG QRS DURATION: 92 MS
EKG QTC CALCULATION (BAZETT): 389 MS
EKG R AXIS: 10 DEGREES
EKG T AXIS: 42 DEGREES
EKG VENTRICULAR RATE: 57 BPM

## 2021-07-29 ENCOUNTER — HOSPITAL ENCOUNTER (OUTPATIENT)
Age: 66
Setting detail: OUTPATIENT SURGERY
Discharge: HOME | End: 2021-07-29
Attending: ORTHOPAEDIC SURGERY | Admitting: ORTHOPAEDIC SURGERY
Payer: COMMERCIAL

## 2021-07-29 ENCOUNTER — ANESTHESIA (OUTPATIENT)
Dept: OPERATING ROOM | Age: 66
End: 2021-07-29
Payer: COMMERCIAL

## 2021-07-29 ENCOUNTER — ANESTHESIA EVENT (OUTPATIENT)
Dept: OPERATING ROOM | Age: 66
End: 2021-07-29
Payer: COMMERCIAL

## 2021-07-29 VITALS
DIASTOLIC BLOOD PRESSURE: 77 MMHG | BODY MASS INDEX: 30.1 KG/M2 | SYSTOLIC BLOOD PRESSURE: 134 MMHG | RESPIRATION RATE: 20 BRPM | OXYGEN SATURATION: 95 % | TEMPERATURE: 97.2 F | HEIGHT: 71 IN | HEART RATE: 64 BPM | WEIGHT: 215 LBS

## 2021-07-29 VITALS — SYSTOLIC BLOOD PRESSURE: 161 MMHG | OXYGEN SATURATION: 95 % | DIASTOLIC BLOOD PRESSURE: 107 MMHG | TEMPERATURE: 78.1 F

## 2021-07-29 DIAGNOSIS — G89.18 ACUTE POSTOPERATIVE PAIN: Primary | ICD-10-CM

## 2021-07-29 LAB
GLUCOSE BLD-MCNC: 160 MG/DL (ref 75–110)
GLUCOSE BLD-MCNC: 80 MG/DL (ref 75–110)

## 2021-07-29 PROCEDURE — 2500000003 HC RX 250 WO HCPCS: Performed by: NURSE ANESTHETIST, CERTIFIED REGISTERED

## 2021-07-29 PROCEDURE — 7100000011 HC PHASE II RECOVERY - ADDTL 15 MIN: Performed by: ORTHOPAEDIC SURGERY

## 2021-07-29 PROCEDURE — 7100000001 HC PACU RECOVERY - ADDTL 15 MIN: Performed by: ORTHOPAEDIC SURGERY

## 2021-07-29 PROCEDURE — 3600000003 HC SURGERY LEVEL 3 BASE: Performed by: ORTHOPAEDIC SURGERY

## 2021-07-29 PROCEDURE — 7100000010 HC PHASE II RECOVERY - FIRST 15 MIN: Performed by: ORTHOPAEDIC SURGERY

## 2021-07-29 PROCEDURE — 6360000002 HC RX W HCPCS: Performed by: NURSE ANESTHETIST, CERTIFIED REGISTERED

## 2021-07-29 PROCEDURE — 2720000010 HC SURG SUPPLY STERILE: Performed by: ORTHOPAEDIC SURGERY

## 2021-07-29 PROCEDURE — 82947 ASSAY GLUCOSE BLOOD QUANT: CPT

## 2021-07-29 PROCEDURE — 3600000013 HC SURGERY LEVEL 3 ADDTL 15MIN: Performed by: ORTHOPAEDIC SURGERY

## 2021-07-29 PROCEDURE — 2580000003 HC RX 258: Performed by: ORTHOPAEDIC SURGERY

## 2021-07-29 PROCEDURE — 2580000003 HC RX 258: Performed by: ANESTHESIOLOGY

## 2021-07-29 PROCEDURE — 2580000003 HC RX 258: Performed by: NURSE ANESTHETIST, CERTIFIED REGISTERED

## 2021-07-29 PROCEDURE — 3700000001 HC ADD 15 MINUTES (ANESTHESIA): Performed by: ORTHOPAEDIC SURGERY

## 2021-07-29 PROCEDURE — 6360000002 HC RX W HCPCS: Performed by: ORTHOPAEDIC SURGERY

## 2021-07-29 PROCEDURE — 6360000002 HC RX W HCPCS: Performed by: ANESTHESIOLOGY

## 2021-07-29 PROCEDURE — 6360000002 HC RX W HCPCS

## 2021-07-29 PROCEDURE — 7100000000 HC PACU RECOVERY - FIRST 15 MIN: Performed by: ORTHOPAEDIC SURGERY

## 2021-07-29 PROCEDURE — C9290 INJ, BUPIVACAINE LIPOSOME: HCPCS | Performed by: ORTHOPAEDIC SURGERY

## 2021-07-29 PROCEDURE — C1713 ANCHOR/SCREW BN/BN,TIS/BN: HCPCS | Performed by: ORTHOPAEDIC SURGERY

## 2021-07-29 PROCEDURE — 3700000000 HC ANESTHESIA ATTENDED CARE: Performed by: ORTHOPAEDIC SURGERY

## 2021-07-29 PROCEDURE — 6370000000 HC RX 637 (ALT 250 FOR IP): Performed by: ANESTHESIOLOGY

## 2021-07-29 PROCEDURE — 2709999900 HC NON-CHARGEABLE SUPPLY: Performed by: ORTHOPAEDIC SURGERY

## 2021-07-29 PROCEDURE — 2500000003 HC RX 250 WO HCPCS: Performed by: ORTHOPAEDIC SURGERY

## 2021-07-29 DEVICE — HEALICOIL SA PK 5.5MM W/2 UB-BL                                    CBRD BL
Type: IMPLANTABLE DEVICE | Site: SHOULDER | Status: FUNCTIONAL
Brand: HEALICOIL / ULTRABRAID

## 2021-07-29 DEVICE — HEALICOIL PK 5.5 MM SUTURE ANCHOR                                    WITH THREE ULTRABRAID NO.2 SUTURES                                    BLUE, BLUE-COBRAID, COBRAID-BLACK STERILE
Type: IMPLANTABLE DEVICE | Site: SHOULDER | Status: FUNCTIONAL
Brand: HEALICOIL

## 2021-07-29 DEVICE — 1.8MM Q-FIX ALL SUTURE ANCHOR
Type: IMPLANTABLE DEVICE | Site: SHOULDER | Status: FUNCTIONAL
Brand: Q-FIX

## 2021-07-29 RX ORDER — ONDANSETRON 2 MG/ML
4 INJECTION INTRAMUSCULAR; INTRAVENOUS
Status: COMPLETED | OUTPATIENT
Start: 2021-07-29 | End: 2021-07-29

## 2021-07-29 RX ORDER — HYDROMORPHONE HYDROCHLORIDE 1 MG/ML
0.5 INJECTION, SOLUTION INTRAMUSCULAR; INTRAVENOUS; SUBCUTANEOUS EVERY 5 MIN PRN
Status: DISCONTINUED | OUTPATIENT
Start: 2021-07-29 | End: 2021-07-29 | Stop reason: HOSPADM

## 2021-07-29 RX ORDER — DOCUSATE SODIUM 100 MG/1
100 CAPSULE, LIQUID FILLED ORAL 2 TIMES DAILY
Qty: 30 CAPSULE | Refills: 0 | Status: SHIPPED | OUTPATIENT
Start: 2021-07-29 | End: 2021-07-29 | Stop reason: SDUPTHER

## 2021-07-29 RX ORDER — FENTANYL CITRATE 50 UG/ML
INJECTION, SOLUTION INTRAMUSCULAR; INTRAVENOUS PRN
Status: DISCONTINUED | OUTPATIENT
Start: 2021-07-29 | End: 2021-07-29 | Stop reason: SDUPTHER

## 2021-07-29 RX ORDER — ONDANSETRON 2 MG/ML
INJECTION INTRAMUSCULAR; INTRAVENOUS PRN
Status: DISCONTINUED | OUTPATIENT
Start: 2021-07-29 | End: 2021-07-29 | Stop reason: SDUPTHER

## 2021-07-29 RX ORDER — GLYCOPYRROLATE 1 MG/5 ML
SYRINGE (ML) INTRAVENOUS PRN
Status: DISCONTINUED | OUTPATIENT
Start: 2021-07-29 | End: 2021-07-29 | Stop reason: SDUPTHER

## 2021-07-29 RX ORDER — SODIUM CHLORIDE, SODIUM LACTATE, POTASSIUM CHLORIDE, CALCIUM CHLORIDE 600; 310; 30; 20 MG/100ML; MG/100ML; MG/100ML; MG/100ML
INJECTION, SOLUTION INTRAVENOUS CONTINUOUS PRN
Status: DISCONTINUED | OUTPATIENT
Start: 2021-07-29 | End: 2021-07-29 | Stop reason: SDUPTHER

## 2021-07-29 RX ORDER — CEFAZOLIN SODIUM 1 G/3ML
INJECTION, POWDER, FOR SOLUTION INTRAMUSCULAR; INTRAVENOUS PRN
Status: DISCONTINUED | OUTPATIENT
Start: 2021-07-29 | End: 2021-07-29 | Stop reason: SDUPTHER

## 2021-07-29 RX ORDER — LIDOCAINE HYDROCHLORIDE 20 MG/ML
INJECTION, SOLUTION EPIDURAL; INFILTRATION; INTRACAUDAL; PERINEURAL PRN
Status: DISCONTINUED | OUTPATIENT
Start: 2021-07-29 | End: 2021-07-29 | Stop reason: SDUPTHER

## 2021-07-29 RX ORDER — BUPIVACAINE HYDROCHLORIDE AND EPINEPHRINE 2.5; 5 MG/ML; UG/ML
INJECTION, SOLUTION EPIDURAL; INFILTRATION; INTRACAUDAL; PERINEURAL
Status: DISCONTINUED
Start: 2021-07-29 | End: 2021-07-29 | Stop reason: HOSPADM

## 2021-07-29 RX ORDER — FENTANYL CITRATE 50 UG/ML
25 INJECTION, SOLUTION INTRAMUSCULAR; INTRAVENOUS EVERY 5 MIN PRN
Status: DISCONTINUED | OUTPATIENT
Start: 2021-07-29 | End: 2021-07-29 | Stop reason: HOSPADM

## 2021-07-29 RX ORDER — PROMETHAZINE HYDROCHLORIDE 25 MG/ML
6.25 INJECTION, SOLUTION INTRAMUSCULAR; INTRAVENOUS
Status: DISCONTINUED | OUTPATIENT
Start: 2021-07-29 | End: 2021-07-29 | Stop reason: HOSPADM

## 2021-07-29 RX ORDER — LABETALOL HYDROCHLORIDE 5 MG/ML
INJECTION, SOLUTION INTRAVENOUS PRN
Status: DISCONTINUED | OUTPATIENT
Start: 2021-07-29 | End: 2021-07-29 | Stop reason: SDUPTHER

## 2021-07-29 RX ORDER — SEVOFLURANE 250 ML/250ML
LIQUID RESPIRATORY (INHALATION)
Status: DISCONTINUED
Start: 2021-07-29 | End: 2021-07-29 | Stop reason: HOSPADM

## 2021-07-29 RX ORDER — OXYCODONE HYDROCHLORIDE AND ACETAMINOPHEN 5; 325 MG/1; MG/1
2 TABLET ORAL PRN
Status: DISCONTINUED | OUTPATIENT
Start: 2021-07-29 | End: 2021-07-29 | Stop reason: HOSPADM

## 2021-07-29 RX ORDER — SODIUM CHLORIDE, SODIUM LACTATE, POTASSIUM CHLORIDE, CALCIUM CHLORIDE 600; 310; 30; 20 MG/100ML; MG/100ML; MG/100ML; MG/100ML
INJECTION, SOLUTION INTRAVENOUS CONTINUOUS
Status: DISCONTINUED | OUTPATIENT
Start: 2021-07-30 | End: 2021-07-29 | Stop reason: HOSPADM

## 2021-07-29 RX ORDER — KETOROLAC TROMETHAMINE 30 MG/ML
INJECTION, SOLUTION INTRAMUSCULAR; INTRAVENOUS PRN
Status: DISCONTINUED | OUTPATIENT
Start: 2021-07-29 | End: 2021-07-29 | Stop reason: SDUPTHER

## 2021-07-29 RX ORDER — LABETALOL HYDROCHLORIDE 5 MG/ML
5 INJECTION, SOLUTION INTRAVENOUS EVERY 10 MIN PRN
Status: DISCONTINUED | OUTPATIENT
Start: 2021-07-29 | End: 2021-07-29 | Stop reason: HOSPADM

## 2021-07-29 RX ORDER — ONDANSETRON 4 MG/1
4 TABLET, FILM COATED ORAL EVERY 6 HOURS PRN
Qty: 30 TABLET | Refills: 1 | Status: SHIPPED | OUTPATIENT
Start: 2021-07-29 | End: 2022-05-05

## 2021-07-29 RX ORDER — ROCURONIUM BROMIDE 10 MG/ML
INJECTION, SOLUTION INTRAVENOUS PRN
Status: DISCONTINUED | OUTPATIENT
Start: 2021-07-29 | End: 2021-07-29 | Stop reason: SDUPTHER

## 2021-07-29 RX ORDER — PROPOFOL 10 MG/ML
INJECTION, EMULSION INTRAVENOUS PRN
Status: DISCONTINUED | OUTPATIENT
Start: 2021-07-29 | End: 2021-07-29 | Stop reason: SDUPTHER

## 2021-07-29 RX ORDER — OXYCODONE HYDROCHLORIDE AND ACETAMINOPHEN 5; 325 MG/1; MG/1
1-2 TABLET ORAL EVERY 4 HOURS PRN
Qty: 50 TABLET | Refills: 0 | Status: SHIPPED | OUTPATIENT
Start: 2021-07-29 | End: 2021-08-05

## 2021-07-29 RX ORDER — TRANEXAMIC ACID 100 MG/ML
INJECTION, SOLUTION INTRAVENOUS
Status: COMPLETED
Start: 2021-07-29 | End: 2021-07-29

## 2021-07-29 RX ORDER — SODIUM CHLORIDE 9 MG/ML
INJECTION INTRAVENOUS
Status: DISCONTINUED
Start: 2021-07-29 | End: 2021-07-29 | Stop reason: HOSPADM

## 2021-07-29 RX ORDER — TRANEXAMIC ACID 100 MG/ML
INJECTION, SOLUTION INTRAVENOUS PRN
Status: DISCONTINUED | OUTPATIENT
Start: 2021-07-29 | End: 2021-07-29 | Stop reason: SDUPTHER

## 2021-07-29 RX ORDER — NEOSTIGMINE METHYLSULFATE 5 MG/5 ML
SYRINGE (ML) INTRAVENOUS PRN
Status: DISCONTINUED | OUTPATIENT
Start: 2021-07-29 | End: 2021-07-29 | Stop reason: SDUPTHER

## 2021-07-29 RX ORDER — LIDOCAINE HYDROCHLORIDE 10 MG/ML
1 INJECTION, SOLUTION EPIDURAL; INFILTRATION; INTRACAUDAL; PERINEURAL
Status: DISCONTINUED | OUTPATIENT
Start: 2021-07-30 | End: 2021-07-29 | Stop reason: HOSPADM

## 2021-07-29 RX ORDER — ACETAMINOPHEN 500 MG
1000 TABLET ORAL ONCE
Status: COMPLETED | OUTPATIENT
Start: 2021-07-29 | End: 2021-07-29

## 2021-07-29 RX ORDER — OXYCODONE HYDROCHLORIDE AND ACETAMINOPHEN 5; 325 MG/1; MG/1
1 TABLET ORAL PRN
Status: DISCONTINUED | OUTPATIENT
Start: 2021-07-29 | End: 2021-07-29 | Stop reason: HOSPADM

## 2021-07-29 RX ORDER — MIDAZOLAM HYDROCHLORIDE 1 MG/ML
INJECTION INTRAMUSCULAR; INTRAVENOUS PRN
Status: DISCONTINUED | OUTPATIENT
Start: 2021-07-29 | End: 2021-07-29 | Stop reason: SDUPTHER

## 2021-07-29 RX ORDER — METOCLOPRAMIDE HYDROCHLORIDE 5 MG/ML
INJECTION INTRAMUSCULAR; INTRAVENOUS
Status: COMPLETED
Start: 2021-07-29 | End: 2021-07-29

## 2021-07-29 RX ORDER — METOCLOPRAMIDE HYDROCHLORIDE 5 MG/ML
10 INJECTION INTRAMUSCULAR; INTRAVENOUS ONCE
Status: COMPLETED | OUTPATIENT
Start: 2021-07-29 | End: 2021-07-29

## 2021-07-29 RX ORDER — DOCUSATE SODIUM 100 MG/1
100 CAPSULE, LIQUID FILLED ORAL 2 TIMES DAILY
Qty: 30 CAPSULE | Refills: 0 | Status: SHIPPED | OUTPATIENT
Start: 2021-07-29 | End: 2022-05-05

## 2021-07-29 RX ORDER — HYDRALAZINE HYDROCHLORIDE 20 MG/ML
5 INJECTION INTRAMUSCULAR; INTRAVENOUS EVERY 10 MIN PRN
Status: DISCONTINUED | OUTPATIENT
Start: 2021-07-29 | End: 2021-07-29 | Stop reason: HOSPADM

## 2021-07-29 RX ADMIN — CEFAZOLIN 2000 MG: 1 INJECTION, POWDER, FOR SOLUTION INTRAMUSCULAR; INTRAVENOUS at 15:10

## 2021-07-29 RX ADMIN — SODIUM CHLORIDE, POTASSIUM CHLORIDE, SODIUM LACTATE AND CALCIUM CHLORIDE: 600; 310; 30; 20 INJECTION, SOLUTION INTRAVENOUS at 16:08

## 2021-07-29 RX ADMIN — FENTANYL CITRATE 100 MCG: 50 INJECTION, SOLUTION INTRAMUSCULAR; INTRAVENOUS at 15:20

## 2021-07-29 RX ADMIN — PROPOFOL 200 MG: 10 INJECTION, EMULSION INTRAVENOUS at 14:51

## 2021-07-29 RX ADMIN — LABETALOL HYDROCHLORIDE 5 MG: 5 INJECTION, SOLUTION INTRAVENOUS at 16:01

## 2021-07-29 RX ADMIN — ONDANSETRON 4 MG: 2 INJECTION INTRAMUSCULAR; INTRAVENOUS at 18:04

## 2021-07-29 RX ADMIN — MIDAZOLAM 2 MG: 1 INJECTION INTRAMUSCULAR; INTRAVENOUS at 14:46

## 2021-07-29 RX ADMIN — SODIUM CHLORIDE, POTASSIUM CHLORIDE, SODIUM LACTATE AND CALCIUM CHLORIDE: 600; 310; 30; 20 INJECTION, SOLUTION INTRAVENOUS at 14:46

## 2021-07-29 RX ADMIN — FENTANYL CITRATE 25 MCG: 50 INJECTION, SOLUTION INTRAMUSCULAR; INTRAVENOUS at 18:20

## 2021-07-29 RX ADMIN — PROPOFOL 50 MG: 10 INJECTION, EMULSION INTRAVENOUS at 15:27

## 2021-07-29 RX ADMIN — ROCURONIUM BROMIDE 50 MG: 10 INJECTION, SOLUTION INTRAVENOUS at 14:51

## 2021-07-29 RX ADMIN — FENTANYL CITRATE 25 MCG: 50 INJECTION, SOLUTION INTRAMUSCULAR; INTRAVENOUS at 18:13

## 2021-07-29 RX ADMIN — LIDOCAINE HYDROCHLORIDE 60 MG: 20 INJECTION, SOLUTION EPIDURAL; INFILTRATION; INTRACAUDAL; PERINEURAL at 14:51

## 2021-07-29 RX ADMIN — FENTANYL CITRATE 50 MCG: 50 INJECTION, SOLUTION INTRAMUSCULAR; INTRAVENOUS at 14:46

## 2021-07-29 RX ADMIN — METOCLOPRAMIDE HYDROCHLORIDE 10 MG: 5 INJECTION INTRAMUSCULAR; INTRAVENOUS at 18:42

## 2021-07-29 RX ADMIN — KETOROLAC TROMETHAMINE 30 MG: 30 INJECTION, SOLUTION INTRAMUSCULAR; INTRAVENOUS at 17:14

## 2021-07-29 RX ADMIN — LABETALOL HYDROCHLORIDE 5 MG: 5 INJECTION, SOLUTION INTRAVENOUS at 15:27

## 2021-07-29 RX ADMIN — FENTANYL CITRATE 100 MCG: 50 INJECTION, SOLUTION INTRAMUSCULAR; INTRAVENOUS at 14:51

## 2021-07-29 RX ADMIN — SODIUM CHLORIDE, POTASSIUM CHLORIDE, SODIUM LACTATE AND CALCIUM CHLORIDE: 600; 310; 30; 20 INJECTION, SOLUTION INTRAVENOUS at 13:36

## 2021-07-29 RX ADMIN — ACETAMINOPHEN 1000 MG: 500 TABLET ORAL at 14:44

## 2021-07-29 RX ADMIN — HYDROMORPHONE HYDROCHLORIDE 0.5 MG: 1 INJECTION, SOLUTION INTRAMUSCULAR; INTRAVENOUS; SUBCUTANEOUS at 18:02

## 2021-07-29 RX ADMIN — ONDANSETRON 4 MG: 2 INJECTION, SOLUTION INTRAMUSCULAR; INTRAVENOUS at 17:15

## 2021-07-29 RX ADMIN — TRANEXAMIC ACID 2000 MG: 100 INJECTION, SOLUTION INTRAVENOUS at 15:20

## 2021-07-29 RX ADMIN — FENTANYL CITRATE 50 MCG: 50 INJECTION, SOLUTION INTRAMUSCULAR; INTRAVENOUS at 16:19

## 2021-07-29 RX ADMIN — HYDROMORPHONE HYDROCHLORIDE 0.5 MG: 1 INJECTION, SOLUTION INTRAMUSCULAR; INTRAVENOUS; SUBCUTANEOUS at 17:52

## 2021-07-29 RX ADMIN — Medication 3 MG: at 17:29

## 2021-07-29 RX ADMIN — Medication 0.3 MG: at 15:19

## 2021-07-29 RX ADMIN — FENTANYL CITRATE 50 MCG: 50 INJECTION, SOLUTION INTRAMUSCULAR; INTRAVENOUS at 17:37

## 2021-07-29 RX ADMIN — Medication 0.4 MG: at 17:29

## 2021-07-29 RX ADMIN — METOCLOPRAMIDE 10 MG: 5 INJECTION, SOLUTION INTRAMUSCULAR; INTRAVENOUS at 18:42

## 2021-07-29 ASSESSMENT — PULMONARY FUNCTION TESTS
PIF_VALUE: 17
PIF_VALUE: 18
PIF_VALUE: 23
PIF_VALUE: 18
PIF_VALUE: 18
PIF_VALUE: 19
PIF_VALUE: 18
PIF_VALUE: 19
PIF_VALUE: 19
PIF_VALUE: 17
PIF_VALUE: 18
PIF_VALUE: 19
PIF_VALUE: 18
PIF_VALUE: 18
PIF_VALUE: 17
PIF_VALUE: 22
PIF_VALUE: 1
PIF_VALUE: 19
PIF_VALUE: 19
PIF_VALUE: 18
PIF_VALUE: 18
PIF_VALUE: 17
PIF_VALUE: 18
PIF_VALUE: 19
PIF_VALUE: 18
PIF_VALUE: 19
PIF_VALUE: 17
PIF_VALUE: 18
PIF_VALUE: 6
PIF_VALUE: 19
PIF_VALUE: 18
PIF_VALUE: 18
PIF_VALUE: 19
PIF_VALUE: 18
PIF_VALUE: 19
PIF_VALUE: 19
PIF_VALUE: 20
PIF_VALUE: 18
PIF_VALUE: 18
PIF_VALUE: 19
PIF_VALUE: 18
PIF_VALUE: 17
PIF_VALUE: 11
PIF_VALUE: 18
PIF_VALUE: 1
PIF_VALUE: 18
PIF_VALUE: 1
PIF_VALUE: 19
PIF_VALUE: 17
PIF_VALUE: 19
PIF_VALUE: 18
PIF_VALUE: 19
PIF_VALUE: 15
PIF_VALUE: 18
PIF_VALUE: 15
PIF_VALUE: 17
PIF_VALUE: 19
PIF_VALUE: 17
PIF_VALUE: 23
PIF_VALUE: 19
PIF_VALUE: 19
PIF_VALUE: 18
PIF_VALUE: 24
PIF_VALUE: 18
PIF_VALUE: 16
PIF_VALUE: 18
PIF_VALUE: 17
PIF_VALUE: 19
PIF_VALUE: 17
PIF_VALUE: 15
PIF_VALUE: 19
PIF_VALUE: 18
PIF_VALUE: 19
PIF_VALUE: 19
PIF_VALUE: 9
PIF_VALUE: 18
PIF_VALUE: 15
PIF_VALUE: 19
PIF_VALUE: 19
PIF_VALUE: 18
PIF_VALUE: 18
PIF_VALUE: 17
PIF_VALUE: 19
PIF_VALUE: 19
PIF_VALUE: 17
PIF_VALUE: 18
PIF_VALUE: 19
PIF_VALUE: 18
PIF_VALUE: 16
PIF_VALUE: 17
PIF_VALUE: 17
PIF_VALUE: 19
PIF_VALUE: 17
PIF_VALUE: 25
PIF_VALUE: 18
PIF_VALUE: 19
PIF_VALUE: 17
PIF_VALUE: 16
PIF_VALUE: 19
PIF_VALUE: 18
PIF_VALUE: 18
PIF_VALUE: 17
PIF_VALUE: 19
PIF_VALUE: 18
PIF_VALUE: 18
PIF_VALUE: 19
PIF_VALUE: 17
PIF_VALUE: 25
PIF_VALUE: 18
PIF_VALUE: 19
PIF_VALUE: 18
PIF_VALUE: 19
PIF_VALUE: 20
PIF_VALUE: 18
PIF_VALUE: 19
PIF_VALUE: 1
PIF_VALUE: 19
PIF_VALUE: 18
PIF_VALUE: 20
PIF_VALUE: 19
PIF_VALUE: 18
PIF_VALUE: 18
PIF_VALUE: 17
PIF_VALUE: 19
PIF_VALUE: 1
PIF_VALUE: 28
PIF_VALUE: 25
PIF_VALUE: 19
PIF_VALUE: 17
PIF_VALUE: 18
PIF_VALUE: 2
PIF_VALUE: 19
PIF_VALUE: 17
PIF_VALUE: 18
PIF_VALUE: 18
PIF_VALUE: 17
PIF_VALUE: 18
PIF_VALUE: 16
PIF_VALUE: 18
PIF_VALUE: 19
PIF_VALUE: 18
PIF_VALUE: 19
PIF_VALUE: 18
PIF_VALUE: 19
PIF_VALUE: 19
PIF_VALUE: 23
PIF_VALUE: 19
PIF_VALUE: 17
PIF_VALUE: 19
PIF_VALUE: 17
PIF_VALUE: 19
PIF_VALUE: 18
PIF_VALUE: 21
PIF_VALUE: 18
PIF_VALUE: 19
PIF_VALUE: 20
PIF_VALUE: 19

## 2021-07-29 ASSESSMENT — PAIN SCALES - GENERAL
PAINLEVEL_OUTOF10: 6
PAINLEVEL_OUTOF10: 10
PAINLEVEL_OUTOF10: 6
PAINLEVEL_OUTOF10: 0
PAINLEVEL_OUTOF10: 10
PAINLEVEL_OUTOF10: 6
PAINLEVEL_OUTOF10: 6

## 2021-07-29 ASSESSMENT — PAIN DESCRIPTION - DESCRIPTORS
DESCRIPTORS: ACHING
DESCRIPTORS: ACHING

## 2021-07-29 ASSESSMENT — PAIN DESCRIPTION - ORIENTATION: ORIENTATION: RIGHT

## 2021-07-29 ASSESSMENT — PAIN DESCRIPTION - LOCATION: LOCATION: SHOULDER

## 2021-07-29 ASSESSMENT — PAIN DESCRIPTION - PAIN TYPE
TYPE: SURGICAL PAIN

## 2021-07-29 ASSESSMENT — PAIN DESCRIPTION - ONSET: ONSET: ON-GOING

## 2021-07-29 ASSESSMENT — PAIN DESCRIPTION - FREQUENCY: FREQUENCY: CONTINUOUS

## 2021-07-29 ASSESSMENT — PAIN - FUNCTIONAL ASSESSMENT: PAIN_FUNCTIONAL_ASSESSMENT: 0-10

## 2021-07-29 NOTE — ANESTHESIA POSTPROCEDURE EVALUATION
Department of Anesthesiology  Postprocedure Note    Patient: Sarita Coombs  MRN: [id number]  YOB: 1955  Date of evaluation: 7/29/2021  Time:  6:50 PM     Procedure Summary     Date: 07/29/21 Room / Location: [address] / [address]    Anesthesia Start: 4891 Anesthesia Stop: 1741    Procedure: RIGHT SHOULDER ARTHROSCOPY, ROTATOR CUFF REPAIR, ACROMIOPLASTY, DISTAL CLAVICAL EXCISION, OPEN SUBPEC BICEPS TENODESIS, AND SUPRASCAPULAR NERVE BLOCK (Right Shoulder) Diagnosis: (DX RIGHT ROTATOR CUFF TEAR)    Surgeons: Donna Garcia MD Responsible Provider: Parish Desouza DO    Anesthesia Type: general ASA Status: 2          Anesthesia Type: general    Don Phase I: Don Score: 10    Don Phase II: Don Score: 10    Last vitals: Reviewed and per EMR flowsheets.        Anesthesia Post Evaluation    Patient location during evaluation: PACU  Patient participation: complete - patient participated  Level of consciousness: awake and alert  Airway patency: patent  Nausea & Vomiting: no vomiting  Complications: no  Cardiovascular status: hemodynamically stable  Respiratory status: acceptable  Hydration status: stable

## 2021-07-29 NOTE — INTERVAL H&P NOTE
Interval H&P Note    Pt Name: Dell Capps  MRN: [id number]  YOB: 1955  Date of evaluation: 7/29/2021      [x] I have reviewed the H&P by CHRISTINA Blue CNP for an Interval History and Physical note. [x] I have examined  Dell Capps  There are no changes to the patient who is scheduled for a Right shoulder arthroscopy rotator cuff repair, acromioplasty, DCE, open subpec biceps tenodesis - suprascapular nerve block by Dr Rishi Lundberg   The patient denies new health changes, fever, chills, wheezing, cough, increased SOB, chest pain, open sores or wounds. Last ASA 81mg and Voltaren 7/22/21  +DM POC BS 80 asymptomatic     Vital signs: /73   Pulse 57   Temp 97.3 °F (36.3 °C) (Temporal)   Resp 16   Ht 5' 11\" (1.803 m)   Wt 215 lb (97.5 kg)   SpO2 97%   BMI 29.99 kg/m²     Allergies:  Meperidine    Medications:    Prior to Admission medications    Medication Sig Start Date End Date Taking? Authorizing Provider   ondansetron (ZOFRAN) 4 MG tablet Take 1 tablet by mouth every 6 hours as needed for Nausea 7/29/21  Yes Augusta Avila MD   oxyCODONE-acetaminophen (PERCOCET) 5-325 MG per tablet Take 1-2 tablets by mouth every 4 hours as needed for Pain for up to 7 days.  7/29/21 8/5/21 Yes Augusta Avila MD   docusate sodium (COLACE) 100 MG capsule Take 1 capsule by mouth 2 times daily 7/29/21  Yes Augusta Avila MD   cetirizine (ZYRTEC) 10 MG tablet Take 10 mg by mouth daily   Yes Historical Provider, MD   Multiple Vitamins-Minerals (CENTRUM SILVER) TABS Take 1 tablet by mouth daily   Yes Historical Provider, MD   albuterol sulfate HFA (VENTOLIN HFA) 108 (90 Base) MCG/ACT inhaler Inhale 2 puffs into the lungs every 6 hours as needed for Wheezing   Yes Historical Provider, MD   fluticasone (FLONASE) 50 MCG/ACT nasal spray 1 spray by Each Nostril route daily   Yes Historical Provider, MD   atorvastatin (LIPITOR) 20 MG tablet Take 20 mg by mouth daily   Yes Historical Provider, MD   omeprazole (PRILOSEC) 40 MG delayed release capsule Take 40 mg by mouth daily   Yes Historical Provider, MD   budesonide-formoterol (SYMBICORT) 160-4.5 MCG/ACT AERO Inhale 2 puffs into the lungs 2 times daily   Yes Historical Provider, MD   Diclofenac Sodium (VOLTAREN PO) Take 75 mg by mouth 2 times daily    Historical Provider, MD   Blood Glucose Monitoring Suppl (PRODIGY AUTOCODE BLOOD GLUCOSE) w/Device KIT Use as directed to test blood sugar daily    Historical Provider, MD   metFORMIN (GLUCOPHAGE-XR) 500 MG extended release tablet Take 500 mg by mouth Daily with supper     Historical Provider, MD   aspirin 81 MG chewable tablet Take 81 mg by mouth daily    Historical Provider, MD         This is a 63 y.o. male who is pleasant, cooperative, alert and oriented x3, in no acute distress. Heart: Heart sounds are normal.  HR 57 asymptomatic bradycardic rate and regular rhythm without murmur, gallop or rub. Lungs: Normal respiratory effort with equal expansion, good air exchange, unlabored and clear to auscultation without wheezes or rales bilaterally   Abdomen: soft, nontender, nondistended with bowel sounds. Labs:  Recent Labs     07/21/21  1433   HGB 13.4   HCT 43.6   WBC 7.0   MCV 85.5         K 4.1      CO2 25   BUN 25*   CREATININE 1.01   GLUCOSE 91       No results for input(s): COVID19 in the last 720 hours.     YUNIEL Vega CNP   Electronically signed 7/29/2021 at 1:39 PM

## 2021-07-29 NOTE — ANESTHESIA PRE PROCEDURE
Department of Anesthesiology  Preprocedure Note       Name:  Nida Morgan   Age:  63 y.o.  :  1955                                          MRN:  [id number]         Date:  2021      Surgeon: Manuel Dillon):  Dora Ward MD    Procedure: Procedure(s):  RIGHT SHOULDER ARTHROSCOPY ROTATOR CUFF REPAIR ACROMIOPLASTY, DCE, OPEN SUBPEC BICEPS TENODESIS- SUPRASCAPULAR NERVE BLOCK- S/N    PT REQUEST DR MCCORMACK OR DR Ton BRUCE Medications prior to admission:   Prior to Admission medications    Medication Sig Start Date End Date Taking? Authorizing Provider   ondansetron (ZOFRAN) 4 MG tablet Take 1 tablet by mouth every 6 hours as needed for Nausea 21  Yes Dora Ward MD   oxyCODONE-acetaminophen (PERCOCET) 5-325 MG per tablet Take 1-2 tablets by mouth every 4 hours as needed for Pain for up to 7 days.  21 Yes Dora Ward MD   docusate sodium (COLACE) 100 MG capsule Take 1 capsule by mouth 2 times daily 21  Yes Dora Ward MD   cetirizine (ZYRTEC) 10 MG tablet Take 10 mg by mouth daily   Yes Historical Provider, MD   Multiple Vitamins-Minerals (CENTRUM SILVER) TABS Take 1 tablet by mouth daily   Yes Historical Provider, MD   albuterol sulfate HFA (VENTOLIN HFA) 108 (90 Base) MCG/ACT inhaler Inhale 2 puffs into the lungs every 6 hours as needed for Wheezing   Yes Historical Provider, MD   fluticasone (FLONASE) 50 MCG/ACT nasal spray 1 spray by Each Nostril route daily   Yes Historical Provider, MD   atorvastatin (LIPITOR) 20 MG tablet Take 20 mg by mouth daily   Yes Historical Provider, MD   omeprazole (PRILOSEC) 40 MG delayed release capsule Take 40 mg by mouth daily   Yes Historical Provider, MD   budesonide-formoterol (SYMBICORT) 160-4.5 MCG/ACT AERO Inhale 2 puffs into the lungs 2 times daily   Yes Historical Provider, MD   Diclofenac Sodium (VOLTAREN PO) Take 75 mg by mouth 2 times daily    Historical Provider, MD   Blood Glucose Monitoring Suppl (PRODIGY AUTOCODE BLOOD GLUCOSE) w/Device KIT Use as directed to test blood sugar daily    Historical Provider, MD   metFORMIN (GLUCOPHAGE-XR) 500 MG extended release tablet Take 500 mg by mouth Daily with supper     Historical Provider, MD   aspirin 81 MG chewable tablet Take 81 mg by mouth daily    Historical Provider, MD       Current medications:    Current Facility-Administered Medications   Medication Dose Route Frequency Provider Last Rate Last Admin    [START ON 7/30/2021] lactated ringers infusion   Intravenous Continuous Morales Casarez MD 50 mL/hr at 07/29/21 1336 New Bag at 07/29/21 1336    [START ON 7/30/2021] lidocaine PF 1 % injection 1 mL  1 mL Intradermal Once PRN Morales Casarez MD        ceFAZolin (ANCEF) 2000 mg in dextrose 5 % 50 mL IVPB  2,000 mg Intravenous Once Dora Ward MD        sodium chloride (PF) 0.9 % injection             bupivacaine-EPINEPHrine PF (MARCAINE-w/EPINEPHrine) 0.25% -1:[id number] injection             bupivacaine liposome (EXPAREL) 1.3 % injection             sodium chloride 0.9 % 6,000 mL with EPINEPHrine (EPINEPHrine HCL) 6 mg    PRN Dora Ward MD   6,000 mL at 07/29/21 1546    sodium chloride 0.9 % 20 mL with bupivacaine-EPINEPHrine (MARCAINE-w/EPINEPHRINE) 0.25% -1:292593 20 mL, bupivacaine liposome (EXPAREL) 20 mL    PRN Dora Ward MD   30 mL at 07/29/21 1528     Facility-Administered Medications Ordered in Other Encounters   Medication Dose Route Frequency Provider Last Rate Last Admin    glycopyrrolate (ROBINUL) injection   Intravenous PRN YUNIEL Salinas CRNA   0.3 mg at 07/29/21 1519    ceFAZolin (ANCEF) injection   Intravenous PRN YUNIEL Salinas - CRNA   2,000 mg at 07/29/21 1510    lactated ringers infusion   Intravenous Continuous PRN YUNIEL Salinas CRNA   New Bag at 07/29/21 1446    midazolam (VERSED) injection   Intravenous PRN Charles Chong APRN - CRNA   2 mg at 07/29/21 1446    fentaNYL (SUBLIMAZE) injection   Intravenous PRN Charles Chong APRN - CRNA   100 mcg at 21 1520    tranexamic acid (CYKLOKAPRON) injection   Intravenous PRN Carmencita Pepper, APRN - CRNA   2,000 mg at 21 1520    lidocaine PF 2 % injection   Intravenous PRN Carmencita Pepper, APRN - CRNA   60 mg at 21 1451    propofol injection   Intravenous PRN Carmencita Pepper, APRN - CRNA   50 mg at 21 1527    rocuronium (ZEMURON) injection   Intravenous PRN Carmencita Pepper, APRN - CRNA   50 mg at 21 1451    labetalol (NORMODYNE;TRANDATE) injection   Intravenous PRN Carmencita Pepper, APRN - CRNA   5 mg at 21 1527       Allergies: Allergies   Allergen Reactions    Meperidine        Problem List:  There is no problem list on file for this patient.       Past Medical History:        Diagnosis Date    Arthritis     Asthma     COPD (chronic obstructive pulmonary disease) ([facility] [address]     Diabetes mellitus (HCC)     type 2    GERD (gastroesophageal reflux disease)     Hyperlipidemia        Past Surgical History:        Procedure Laterality Date    ANTERIOR CRUCIATE LIGAMENT REPAIR Right     APPENDECTOMY      BICEPS TENDON REPAIR Right     CARDIAC SURGERY      cardiac cath negative     COLONOSCOPY      polyp removed negative     EYE SURGERY      lasik    HERNIA REPAIR      bilat at 17    JOINT REPLACEMENT      bilat hips     TONSILLECTOMY         Social History:    Social History     Tobacco Use    Smoking status: Former Smoker     Types: Cigarettes     Quit date:      Years since quittin.5    Smokeless tobacco: Never Used   Substance Use Topics    Alcohol use: Yes     Comment: occasionally                                 Counseling given: Not Answered      Vital Signs (Current):   Vitals:    21 1319 21 1324   BP: 131/73    Pulse: 57    Resp: 16    Temp: 97.3 °F (36.3 °C)    TempSrc: Temporal    SpO2: 97%    Weight:  215 lb (97.5 kg)   Height:  5' 11\" (1.803 m)                                              BP Readings from Last 3 Encounters:   07/29/21 131/73   07/29/21 101/70   07/21/21 (!) 138/98       NPO Status: Time of last liquid consumption: 2300                        Time of last solid consumption: 2230                        Date of last liquid consumption: 07/28/21                        Date of last solid food consumption: 07/28/21    BMI:   Wt Readings from Last 3 Encounters:   07/29/21 215 lb (97.5 kg)   07/21/21 215 lb (97.5 kg)     Body mass index is 29.99 kg/m².     CBC:   Lab Results   Component Value Date    WBC 7.0 07/21/2021    RBC 5.10 07/21/2021    HGB 13.4 07/21/2021    HCT 43.6 07/21/2021    MCV 85.5 07/21/2021    RDW 13.5 07/21/2021     07/21/2021       CMP:   Lab Results   Component Value Date     07/21/2021    K 4.1 07/21/2021     07/21/2021    CO2 25 07/21/2021    BUN 25 07/21/2021    CREATININE 1.01 07/21/2021    GFRAA >60 07/21/2021    LABGLOM >60 07/21/2021    GLUCOSE 91 07/21/2021    PROT 7.3 02/15/2021    CALCIUM 9.9 07/21/2021    BILITOT 0.38 02/15/2021    ALKPHOS 81 02/15/2021    AST 21 02/15/2021    ALT 20 02/15/2021       POC Tests:   Recent Labs     07/29/21  1329   POCGLU 80       Coags: No results found for: PROTIME, INR, APTT    HCG (If Applicable): No results found for: PREGTESTUR, PREGSERUM, HCG, HCGQUANT     ABGs: No results found for: PHART, PO2ART, LCC0DCH, FHM5TFJ, BEART, D2WGGPCA     Type & Screen (If Applicable):  No results found for: LABABO, LABRH    Drug/Infectious Status (If Applicable):  No results found for: HIV, HEPCAB    COVID-19 Screening (If Applicable):   Lab Results   Component Value Date    COVID19 Not Detected 12/02/2020           Anesthesia Evaluation  Patient summary reviewed and Nursing notes reviewed no history of anesthetic complications:   Airway: Mallampati: II  TM distance: >3 FB   Neck ROM: full  Mouth opening: > = 3 FB Dental: normal exam         Pulmonary:normal exam    (+) COPD:  asthma:                            Cardiovascular:  Exercise tolerance: good (>4 METS),   (+) hyperlipidemia    (-) past MI and CAD        Rate: normal                    Neuro/Psych:               GI/Hepatic/Renal:   (+) GERD:,           Endo/Other:    (+) Diabetes, . Abdominal:             Vascular: Other Findings:             Anesthesia Plan      general     ASA 2       Induction: intravenous. MIPS: Postoperative opioids intended and Prophylactic antiemetics administered. Anesthetic plan and risks discussed with patient. Plan discussed with CRNA.     Attending anesthesiologist reviewed and agrees with Preprocedure content              Ramond Dhiraj, DO   7/29/2021

## 2021-07-29 NOTE — OP NOTE
Operative Note      Patient: Luiza Urban  YOB: 1955  MRN: [id number]    Date of Procedure: 7/29/2021    Pre-Op Diagnosis: DX RIGHT ROTATOR CUFF TEAR ac joint OA    Post-Op Diagnosis: Same       Procedure(s):  RIGHT SHOULDER ARTHROSCOPY, ROTATOR CUFF REPAIR, ACROMIOPLASTY, DISTAL CLAVICAL EXCISION, OPEN SUBPEC BICEPS TENODESIS, AND SUPRASCAPULAR NERVE BLOCK    Surgeon(s):  Lamine Ocampo MD    Assistant:   Resident: Yvette Brooks DO    Anesthesia: General    Estimated Blood Loss (mL): Minimal    Complications: None    Specimens:   * No specimens in log *    Implants:  Implant Name Type Inv. Item Serial No.  Lot No. LRB No. Used Action   ANCHOR SUT DIA1. 8MM FOR ACET LABRAL REP Q-FIX  ANCHOR SUT DIA1. 8MM FOR ACET LABRAL REP Q-FIX  [facility] AND [facility] [employer] [id number] Right 1 Implanted   ANCHOR SUT DIA5. 5MM TWO ULTRABRAID SUT FOR ROT CUF SFT TISS  ANCHOR SUT DIA5. 5MM TWO ULTRABRAID SUT FOR ROT CUF SFT TISS  [facility] AND [employer] [id number] Right 1 Implanted   ANCHOR SUT DIA5.5MM W/ 2 3 COBRAID VEE BLK ULTRABRAID SUT  ANCHOR SUT DIA5.5MM W/ 2 3 COBRAID VEE BLK ULTRABRAID SUT  [employer] [id number] Right 1 Implanted         Drains: * No LDAs found *    Findings: Patient had a tear of the subscapularis, intra-articular portion of the biceps, supraspinatus, AC joint osteoarthritis    Detailed Description of Procedure: This is a 26-year-old male with a longstanding history of shoulder pain is failed attempts at conservative management. He is elected to undergo a shoulder arthroscopy for treatment of his problem. After informed consent was obtained the patient was brought to the operating room placed supine the operating table placed under general anesthesia. After the patient was placed under anesthesia he was positioned in the lateral decubitus position with the right shoulder up. An axillary roll was placed 1 handbreadth below his axilla.   He was secured into position using safety straps and beanbag. All bony prominences were padded. Examination under anesthesia was performed. Patient had full passive range of motion and no instability. The arm was then cleaned with a hydroperoxide solution and a chlorhexidine soap. The arm was dried and then prepared with a ChloraPrep solution after 3 minutes of drying time was draped in a sterile fashion. After the arm was prepped and draped and placed in 10 pounds of traction at timeout was completed. After timeout was completed spinal needle was inserted just posterior to the [facility] joint a suprascapular nerve block was performed. After that was completed standard posterior arthroscopic portal was created and diagnostic arthroscopy the joint was performed. Entering into the joint patient had obvious tearing of the intra-articular portion of the biceps tendon. He also had tearing of the upper border of the subscapularis. The anterior labrum inferior labrum and posterior labrum looked good. There is minimal degenerative change in the glenohumeral joint. There is evidence of a full-thickness tear of the supraspinatus. An anterior arthroscopic portal was created and debridement of the biceps tendon and subscapularis were performed. A biceps tenotomy was performed. The instruments removed from the shoulder. The arm was taken out of traction and an incision was created in the axillary region. Dissection was carried down to the inferior border of the pectoralis major. Dissection under the pectoralis major laterally to the attachment on the humerus was performed. The biceps tendon was identified and retrieved through the incision. The biceps tendon was degenerative along its entire intra and extra-articular portions within the bicipital groove. The bone at the inferior aspect of the bicipital groove was cleaned and scored.   A single Q fix suture anchor was placed and a whipstitch was placed through the muscle tendon junction of the biceps and a biceps tenodesis was performed at this level. The proximal portion of the biceps was removed. After the tenodesis was completed the wound was irrigated and closed with an 0 Vicryl 2-0 Vicryl in a running 3 OV lock suture. Skin glue was placed on the incision at the end of the case. After that was completed and returned to the shoulder. Intra-articularly the subscapularis was seen to be torn. The subscap was debrided and the lesser tuberosity was debrided to create a healing bed of bone. A single double loaded 5.5 mm helical oil suture anchor was placed in the lesser tuberosity. A horizontal mattress suture was placed and tied. Then a locking whipstitch was placed on the leading edge of the superior lateral border of the subscap and also tied giving us an excellent repair. The instruments were then removed from the shoulder and I went into the subacromial space. The subacromial space complete bursectomy was performed. Patient had a large tear of the supraspinatus tendon that was retracted the infraspinatus tendon was intact. This was a V-shaped tear. The greater tuberosity was debrided to a bleeding bed of bone. An acromioplasty using a cutting block technique was also performed. Patient had significant AC joint osteoarthritis. A distal clavicle excision was also performed removing 1 cm of distal clavicle. After that was completed margin convergence sutures were placed using ultra tape. 2 tapes were placed giving us an excellent closure of our supraspinatus. A double loaded helical oil suture anchor was then placed in the greater tuberosity and 2 more horizontal mattress sutures incorporating the anterior and posterior aspects of the cuff were performed closing the tear completely down. The cuff moved as a unit we were done. Very stable repair was created. The remainder of her Exparel solution was injected in the subacromial space at the end of the case.   All portals were closed with a 3-0 nylon suture and sterile dressings were placed. Patient's arm was placed in a sling and he was transported to recovery in stable condition.   Electronically signed by Dano Osuna MD on 7/29/2021 at 5:30 PM

## 2021-09-10 ENCOUNTER — HOSPITAL ENCOUNTER (OUTPATIENT)
Dept: PHYSICAL THERAPY | Facility: CLINIC | Age: 66
Setting detail: THERAPIES SERIES
Discharge: HOME | End: 2021-09-10
Payer: COMMERCIAL

## 2021-09-10 PROCEDURE — 97161 PT EVAL LOW COMPLEX 20 MIN: CPT

## 2021-09-10 PROCEDURE — 97110 THERAPEUTIC EXERCISES: CPT

## 2021-09-10 NOTE — CONSULTS
[x] [facility]  Outpatient Rehabilitation &  Therapy  [facility]   [place]: [phone]  [place]: [phone]        Physical Therapy Upper Extremity Evaluation    Date:  9/10/2021  Patient: Maria Dee   : 1955  MRN: [id number]  Physician: Dr María Nguyen: Medical [place] (VERIFICATION PENDING)  Medical Diagnosis: RUE RTC Repair  Rehab Codes: M62.81 (Muscle Weakness), M62.9 (Disorder of Muscle), M79.1 (Myalgia), Z73.6 (Limitation of ADLs)   Onset Date: 3-2021     Next 's appt. : 21    Subjective:   CC/HPI: Pt with RUE RTC pain, multiple failed conservative attempts at recovery. Patient with RUE shoulder pain that has been there for almost a year. In 2021 had a quick extension of the elbow and possible bicep tear at that time. Saw Dr Asha Zhao, given HEP and injection helped for 2 weeks approximately but did not do better. Summer he did a lot of lifting/moving and re-injured the shoulder. Pt was diagnosed with RTC tear and AC jt OA. Underwent surgery, Date of Procedure: 2021  RUE Shoulder scope, RTC repair, acromioplasty, distal clavicle excision, bicep tenodesis    Currently 6 weeks post-op, saw Dr Asha Zhao 2 days ago. He is currently off work until Dec 2021. PMHx: OA, bilat BLAYNE     [] Unremarkable             [x] Refer to full medical chart  In EPIC     Tests: [] X-Ray:   [] MRI:   [] Other:    Medications: [x] Refer to full medical record [] None [] Other:  Allergies:      [x] Refer to full medical record [] None [] Other:    Function:  Hand Dominance  [x] Right  [] Left  Marital Status Lives with wife    [address]   Job status [employer]   Work Activities/duties  Full time   Currently off work     Recreational Activities Bike, walk        Pain present?  yes   Location RUE shoulder, [place]   Pain Rating currently 1/10   Pain at worse 5/10   Pain at best 1/10   Description of pain RUE shoulder, [place], [place]   Altered Sensation Improve score on assessment tool Quick DASH from 45% impairment to less than 20% impairment  []  []  []     2. Reduce pain levels to 0-1/10 or less with ADLs []  []  []      []  []  []                Patient goals: improve mobility       Rehab Potential:  [x] Good  [] Fair  [] Poor   Suggested Professional Referral:  [x] No  [] Yes:  Barriers to Goal Achievement[de-identified]  [x] No  [] Yes:  Domestic Concerns:  [x] No  [] Yes:    Pt. Education:  [x] Plans/Goals, Risks/Benefits discussed  [x] Home exercise program  Method of Education: [x] Verbal  [x] Demo  [x] Written  Comprehension of Education:  [x] Verbalizes understanding. [x] Demonstrates understanding. [x] Needs Review. [] Demonstrates/verbalizes understanding of HEP/Ed previously given. Treatment Plan:  [x] Therapeutic Exercise    [] Modalities:  [] Therapeutic Activity    [] Ultrasound  [] Electrical Stimulation  [] Gait Training     [] Lumbar/Cervical Traction  [x] Neuromuscular Re-education [] Cold/hotpack [] Iontophoresis: 4 mg/mL  [x] Instruction in HEP              Dexamethasone Sodium Phosphate 40-80 mAmin  [x] Manual Therapy             []  Aquatic Therapy       [] Vasocompression: Reese Reeder  [] Other:    []  Medication allergies reviewed for use of    Dexamethasone Sodium Phosphate 4mg/ml     with iontophoresis treatments. Pt is not allergic. Frequency:  2 x/week for 20 visits      Todays Treatment:       Exercises:  Exercise    RUE RTC repair  DOS 7-29-21  Follow protocol  Reps/ Time Weight/ Level Comments         Pulleys  flexion HEP         Elbow flex/ext ROM      Web       Shoulder retractions      UT Stretch            Table slides flex      Table slides Scapt            Pendulum              Manual: RUE PROM flex, abd, ER up to ROM limits per protocol.      Specific Instructions for next treatment: Advance per protocol     Evaluation Complexity:  History (Personal factors, comorbidities) [x] 0 [] 1-2 [] 3+   Exam (limitations, restrictions) [x] 1-2 [] 3 [] 4+   Clinical presentation (progression) [x] Stable [] Evolving  [] Unstable   Decision Making [x] Low [] Moderate [] High    [x] Low Complexity [] Moderate Complexity [] High Complexity       Treatment Charges: Mins Units   [x] Evaluation       [x]  Low       []  Moderate       []  High 25 1   []  Modalities     []  Ther Exercise 10 1   []  Manual Therapy     []  Ther Activities     []  Aquatics     []  Vasocompression     []  Other       TOTAL TREATMENT TIME: 40    Time in: 3811      Time out: 7441    Electronically signed by: Elicia Ortiz PT        Physician Signature:________________________________Date:__________________  By signing above or cosigning this note, I have reviewed this plan of care and certify a need for medically necessary rehabilitation services.      *PLEASE SIGN ABOVE AND FAX BACK ALL PAGES*

## 2021-09-20 ENCOUNTER — HOSPITAL ENCOUNTER (OUTPATIENT)
Dept: PHYSICAL THERAPY | Facility: CLINIC | Age: 66
Setting detail: THERAPIES SERIES
Discharge: HOME | End: 2021-09-20
Payer: COMMERCIAL

## 2021-09-20 PROCEDURE — 97110 THERAPEUTIC EXERCISES: CPT

## 2021-09-20 PROCEDURE — 97016 VASOPNEUMATIC DEVICE THERAPY: CPT

## 2021-09-20 NOTE — FLOWSHEET NOTE
[x] [facility]  Outpatient Rehabilitation &  Therapy  [facility]   [place]: [phone]  F: [phone]      Physical Therapy Daily Treatment Note    Date:  2021  Patient Name:  Sheryle Dell    :  1955  MRN: [id number]  Physician: Dr Dave Butler: Compa Meyer # of visits allowed/remaining: Based on Med Nec  Medical Diagnosis: RUE RTC Repair                      Rehab Codes: M62.81 (Muscle Weakness), M62.9 (Disorder of Muscle), M79.1 (Myalgia), Z73.6 (Limitation of ADLs)   Onset Date: 3-2021                                                   Next 's appt. : 21    Visit# / total visits:      Cancels/No Shows:     Subjective:    Pain:  [] Yes  [x] No Location: RUE  Pain Rating: (0-10 scale) 0/10  Pain altered Tx:  [x] No  [] Yes  Action:  Comments: Patient arrived noting no pain just soreness from increased activity over the weekend. Objective:  Exercises: 7 weeks on      6-12 weeks  140 - Fwd flexion  40 ER  60-80 Abd    No strengthening/resisted motions of the shoulder until 12 weeks post-op    Exercise     RUE RTC repair  DOS 21  Follow protocol  Reps/ Time Weight/ Level Comments             Pulleys  4' flexion              Elbow flex/ext ROM         Web   x20  Yellow     Shoulder retractions  10x10\"       UT Stretch  3x30\"                 Table slides flex  10x10\"       Table slides Scapt  10x10\"                 Pendulum  x30       Wall walks  10x10\"          Manual: RUE PROM flex, abd, ER up to ROM limits per protocol. Treatment Charges: Mins Units   []  Modalities     [x]  Ther Exercise 30 2   []  Manual Therapy     []  Ther Activities     []  Aquatics     [x]  Vasocompression 15 1   []  Other     Total Treatment time 45 3       Assessment: [x] Progressing toward goals. Progressed program per protocol. Patient notes soreness with ROM with no complaint of pain.  Will continue progressions within protocol limits. [] No change. [] Other:  [x] Patient would continue to benefit from skilled physical therapy services in order to: increase strength and ROM to improve function with ADLs.    Goals  MET NOT MET ON-  GOING  Details   Date Addressed:            STG: To be met in 10 treatments  ?          1. ? Pain: Decrease pain levels to 3/10 with ADLs  []? ?  []??  []??      2. ? ROM: Increase flexibility and AROM limitations throughout to equal bilat to reduce difficulty with ADLs []? ?  []??  []??      3. ? Strength: Increase MMT to 5/5 throughout to ease functional limitations and mobility  []? ?  []??  []??      4. Independent with Home Exercise Programs []? ?  []??  []??        []? ?  []??  []??      Date Addressed:            LTG: To be met in 20 treatments           1. Improve score on assessment tool Quick DASH from 45% impairment to less than 20% impairment  []??  []??  []??      2. Reduce pain levels to 0-1/10 or less with ADLs []? ?  []??  []??        []? ?  []??  []??                        Patient goals: improve mobility       Pt. Education:  [x] Yes  [] No  [] Reviewed Prior HEP/Ed  Method of Education: [x] Verbal  [] Demo  [] Written  Comprehension of Education:  [x] Verbalizes understanding. [] Demonstrates understanding. [] Needs review. [x] Demonstrates/verbalizes HEP/Ed previously given. Plan: [x] Continue current frequency toward long and short term goals. [x] Specific Instructions for subsequent treatments: continue progressions per protocol.        Time In: 6123              Time Out: 1050    Electronically signed by:  Neha Harvey PTA

## 2021-09-22 ENCOUNTER — HOSPITAL ENCOUNTER (OUTPATIENT)
Dept: PHYSICAL THERAPY | Facility: CLINIC | Age: 66
Setting detail: THERAPIES SERIES
Discharge: HOME | End: 2021-09-22
Payer: COMMERCIAL

## 2021-09-22 PROCEDURE — 97110 THERAPEUTIC EXERCISES: CPT

## 2021-09-22 PROCEDURE — 97016 VASOPNEUMATIC DEVICE THERAPY: CPT

## 2021-09-22 NOTE — FLOWSHEET NOTE
[x] [facility]  Outpatient Rehabilitation &  Therapy  [facility]   [place]: [phone]  F: [phone]      Physical Therapy Daily Treatment Note    Date:  2021  Patient Name:  Taisha Caldera    :  1955  MRN: [id number]  Physician: Dr Oliver Salinas: Sandra Guardado # of visits allowed/remaining: Based on Med Nec  Medical Diagnosis: RUE RTC Repair                      Rehab Codes: M62.81 (Muscle Weakness), M62.9 (Disorder of Muscle), M79.1 (Myalgia), Z73.6 (Limitation of ADLs)   Onset Date: 3-2021                                                   Next 's appt. : 21    Visit# / total visits: 3/20     Cancels/No Shows:     Subjective:    Pain:  [] Yes  [x] No Location: RUE  Pain Rating: (0-10 scale) 0/10  Pain altered Tx:  [x] No  [] Yes  Action:  Comments: Patient arrived noting no pain just tightness. Objective:  Exercises: 8 weeks on      6-12 weeks  140 - Fwd flexion  40 ER  60-80 Abd    No strengthening/resisted motions of the shoulder until 12 weeks post-op    Exercise     RUE RTC repair  DOS 21  Follow protocol  Reps/ Time Weight/ Level Comments             Pulleys  4' flexion              Elbow flex/ext ROM         Web   x20  Red     Shoulder retractions  10x10\"       UT Stretch  3x30\"                 Table slides flex  10x10\"       Table slides Scapt  10x10\"                 Pendulum  x30       Wall walks  10x10\"          Manual: RUE PROM flex, abd, ER up to ROM limits per protocol. Treatment Charges: Mins Units   []  Modalities     [x]  Ther Exercise 30 2   []  Manual Therapy     []  Ther Activities     []  Aquatics     [x]  Vasocompression 15 1   []  Other     Total Treatment time 45 3       Assessment: [x] Progressing toward goals. Continued with ROM program this date, patient notes soreness and tightness with ROM within protocol. [] No change.      [] Other:  [x] Patient would continue to benefit from skilled physical therapy services in order to: increase strength and ROM to improve function with ADLs.    Goals  MET NOT MET ON-  GOING  Details   Date Addressed:            STG: To be met in 10 treatments  ?          1. ? Pain: Decrease pain levels to 3/10 with ADLs  []? ?  []??  []??      2. ? ROM: Increase flexibility and AROM limitations throughout to equal bilat to reduce difficulty with ADLs []? ?  []??  []??      3. ? Strength: Increase MMT to 5/5 throughout to ease functional limitations and mobility  []? ?  []??  []??      4. Independent with Home Exercise Programs []? ?  []??  []??        []? ?  []??  []??      Date Addressed:            LTG: To be met in 20 treatments           1. Improve score on assessment tool Quick DASH from 45% impairment to less than 20% impairment  []??  []??  []??      2. Reduce pain levels to 0-1/10 or less with ADLs []? ?  []??  []??        []? ?  []??  []??                        Patient goals: improve mobility       Pt. Education:  [x] Yes  [] No  [] Reviewed Prior HEP/Ed  Method of Education: [x] Verbal  [] Demo  [] Written  Comprehension of Education:  [x] Verbalizes understanding. [] Demonstrates understanding. [] Needs review. [x] Demonstrates/verbalizes HEP/Ed previously given. Plan: [x] Continue current frequency toward long and short term goals. [x] Specific Instructions for subsequent treatments: continue progressions per protocol.        Time In: 1000              Time Out: 1050    Electronically signed by:  Phoebe Sung PTA

## 2021-09-27 ENCOUNTER — HOSPITAL ENCOUNTER (OUTPATIENT)
Dept: PHYSICAL THERAPY | Facility: CLINIC | Age: 66
Setting detail: THERAPIES SERIES
Discharge: HOME | End: 2021-09-27
Payer: COMMERCIAL

## 2021-09-29 ENCOUNTER — HOSPITAL ENCOUNTER (OUTPATIENT)
Dept: PHYSICAL THERAPY | Facility: CLINIC | Age: 66
Setting detail: THERAPIES SERIES
Discharge: HOME | End: 2021-09-29
Payer: COMMERCIAL

## 2021-09-29 PROCEDURE — 97016 VASOPNEUMATIC DEVICE THERAPY: CPT

## 2021-09-29 PROCEDURE — 97110 THERAPEUTIC EXERCISES: CPT

## 2021-09-29 NOTE — FLOWSHEET NOTE
no complaint of pain. [] No change. [] Other:  [x] Patient would continue to benefit from skilled physical therapy services in order to: increase strength and ROM to improve function with ADLs.    Goals  MET NOT MET ON-  GOING  Details   Date Addressed:            STG: To be met in 10 treatments  ?          1. ? Pain: Decrease pain levels to 3/10 with ADLs  []? ?  []??  []??      2. ? ROM: Increase flexibility and AROM limitations throughout to equal bilat to reduce difficulty with ADLs []? ?  []??  []??      3. ? Strength: Increase MMT to 5/5 throughout to ease functional limitations and mobility  []? ?  []??  []??      4. Independent with Home Exercise Programs []? ?  []??  []??        []? ?  []??  []??      Date Addressed:            LTG: To be met in 20 treatments           1. Improve score on assessment tool Quick DASH from 45% impairment to less than 20% impairment  []??  []??  []??      2. Reduce pain levels to 0-1/10 or less with ADLs []? ?  []??  []??        []? ?  []??  []??                        Patient goals: improve mobility       Pt. Education:  [x] Yes  [] No  [x] Reviewed Prior HEP/Ed: Stressed importance of remaining within protocol and precautions. Method of Education: [x] Verbal  [] Demo  [] Written  Comprehension of Education:  [x] Verbalizes understanding. [] Demonstrates understanding. [] Needs review. [x] Demonstrates/verbalizes HEP/Ed previously given. Plan: [x] Continue current frequency toward long and short term goals. [x] Specific Instructions for subsequent treatments: continue progressions per protocol.        Time In: 1000              Time Out: 1045    Electronically signed by:  Iraj Montes PTA

## 2021-10-04 ENCOUNTER — HOSPITAL ENCOUNTER (OUTPATIENT)
Dept: PHYSICAL THERAPY | Facility: CLINIC | Age: 66
Setting detail: THERAPIES SERIES
Discharge: HOME | End: 2021-10-04
Payer: COMMERCIAL

## 2021-10-04 PROCEDURE — 97016 VASOPNEUMATIC DEVICE THERAPY: CPT

## 2021-10-04 PROCEDURE — 97110 THERAPEUTIC EXERCISES: CPT

## 2021-10-04 NOTE — FLOWSHEET NOTE
[x] [facility]  Outpatient Rehabilitation &  Therapy  [facility]   [place]: [phone]  F: [phone]      Physical Therapy Daily Treatment Note    Date:  10/4/2021  Patient Name:  Jose Garces    :  1955  MRN: [id number]  Physician: Dr Arboleda Bath: Chandler Luis # of visits allowed/remaining: Based on Med Nec  Medical Diagnosis: RUE RTC Repair                      Rehab Codes: M62.81 (Muscle Weakness), M62.9 (Disorder of Muscle), M79.1 (Myalgia), Z73.6 (Limitation of ADLs)   Onset Date: 3-2021                                                   Next 's appt. : 21    Visit# / total visits:      Cancels/No Shows:     Subjective:    Pain:  [] Yes  [x] No Location: RUE  Pain Rating: (0-10 scale) 0/10  Pain altered Tx:  [x] No  [] Yes  Action:  Comments: Patient arrived tightness and soreness in posterior shoulder. Objective:  Exercises: 9 weeks on       6-12 weeks   140 - Fwd flexion  40 ER  60-80 Abd    No strengthening/resisted motions of the shoulder until 12 weeks post-op    Exercise     RUE RTC repair  DOS 21  Follow protocol  Reps/ Time Weight/ Level Comments             Pulleys  4' flexion              Elbow flex/ext ROM         Web   x20  Green     Shoulder retractions  10x10\"       UT Stretch  3x30\"                 Table slides flex  10x10\"       Table slides Scapt  10x10\"                 Pendulum  x30       Wall walks  10x10\"           Manual: RUE PROM flex, abd, ER up to ROM limits per protocol. Treatment Charges: Mins Units   []  Modalities     [x]  Ther Exercise 30 2   []  Manual Therapy     []  Ther Activities     []  Aquatics     [x]  Vasocompression 15 1   []  Other     Total Treatment time 45 3       Assessment: [x] Progressing toward goals. Continued with ROM program per protocol with no issues with ROM noted.  Stressed importance of not over doing it at home and staying within precautions per protocol. [] No change. [] Other:  [x] Patient would continue to benefit from skilled physical therapy services in order to: increase strength and ROM to improve function with ADLs.    Goals  MET NOT MET ON-  GOING  Details   Date Addressed:            STG: To be met in 10 treatments  ?          1. ? Pain: Decrease pain levels to 3/10 with ADLs  []? ?  []??  []??      2. ? ROM: Increase flexibility and AROM limitations throughout to equal bilat to reduce difficulty with ADLs []? ?  []??  []??      3. ? Strength: Increase MMT to 5/5 throughout to ease functional limitations and mobility  []? ?  []??  []??      4. Independent with Home Exercise Programs []? ?  []??  []??        []? ?  []??  []??      Date Addressed:            LTG: To be met in 20 treatments           1. Improve score on assessment tool Quick DASH from 45% impairment to less than 20% impairment  []??  []??  []??      2. Reduce pain levels to 0-1/10 or less with ADLs []? ?  []??  []??        []? ?  []??  []??                        Patient goals: improve mobility       Pt. Education:  [x] Yes  [] No  [x] Reviewed Prior HEP/Ed: Stressed importance of remaining within protocol and precautions. Method of Education: [x] Verbal  [] Demo  [] Written  Comprehension of Education:  [x] Verbalizes understanding. [] Demonstrates understanding. [] Needs review. [x] Demonstrates/verbalizes HEP/Ed previously given. Plan: [x] Continue current frequency toward long and short term goals. [x] Specific Instructions for subsequent treatments: continue progressions per protocol.         Time In: 1000              Time Out: 1050    Electronically signed by:  Carlyn Tan PTA

## 2021-10-05 ENCOUNTER — HOSPITAL ENCOUNTER (OUTPATIENT)
Age: 66
Discharge: HOME | End: 2021-10-07
Payer: COMMERCIAL

## 2021-10-05 ENCOUNTER — HOSPITAL ENCOUNTER (OUTPATIENT)
Dept: GENERAL RADIOLOGY | Age: 66
Discharge: HOME | End: 2021-10-07
Payer: COMMERCIAL

## 2021-10-05 DIAGNOSIS — M25.562 ACUTE PAIN OF LEFT KNEE: ICD-10-CM

## 2021-10-05 PROCEDURE — 73564 X-RAY EXAM KNEE 4 OR MORE: CPT

## 2021-10-06 ENCOUNTER — HOSPITAL ENCOUNTER (OUTPATIENT)
Dept: PHYSICAL THERAPY | Facility: CLINIC | Age: 66
Setting detail: THERAPIES SERIES
End: 2021-10-06
Payer: COMMERCIAL

## 2021-10-07 ENCOUNTER — HOSPITAL ENCOUNTER (OUTPATIENT)
Dept: MRI IMAGING | Facility: CLINIC | Age: 66
Discharge: HOME | End: 2021-10-09
Payer: COMMERCIAL

## 2021-10-07 DIAGNOSIS — M25.562 ACUTE PAIN OF LEFT KNEE: ICD-10-CM

## 2021-10-07 PROCEDURE — 73721 MRI JNT OF LWR EXTRE W/O DYE: CPT

## 2021-10-08 ENCOUNTER — HOSPITAL ENCOUNTER (OUTPATIENT)
Dept: PHYSICAL THERAPY | Facility: CLINIC | Age: 66
Setting detail: THERAPIES SERIES
Discharge: HOME | End: 2021-10-08
Payer: COMMERCIAL

## 2021-10-08 PROCEDURE — 97110 THERAPEUTIC EXERCISES: CPT

## 2021-10-08 PROCEDURE — 97016 VASOPNEUMATIC DEVICE THERAPY: CPT

## 2021-10-08 NOTE — FLOWSHEET NOTE
[x] [facility]  Outpatient Rehabilitation &  Therapy  [facility]   [place]: [phone]  F: [phone]      Physical Therapy Daily Treatment Note    Date:  10/8/2021  Patient Name:  Lyubov Leyva    :  1955  MRN: [id number]  Physician: Dr Iraida Doan: Jeffrey Cam # of visits allowed/remaining: Based on Med Nec  Medical Diagnosis: RUE RTC Repair                      Rehab Codes: M62.81 (Muscle Weakness), M62.9 (Disorder of Muscle), M79.1 (Myalgia), Z73.6 (Limitation of ADLs)   Onset Date: 3-2021                                                   Next 's appt. : 21    Visit# / total visits:      Cancels/No Shows:     Subjective:    Pain:  [] Yes  [x] No Location: RUE  Pain Rating: (0-10 scale) 2/10  Pain altered Tx:  [x] No  [] Yes  Action:  Comments: Patient arrived with mild pain in the RUE 2/10 mainly along the post aspect, spine of scapula. Pain 2/10 currently. He also is having LLE knee pain from his dog running into while cleaning his basement. He had an MRI, (+) for meniscus tear. Sees Dr Sridhar Hernandez on Monday. Objective:  Exercises: 9 weeks on   , 12 weeks will be 10/21/21      6-12 weeks ROM protocol:   140 - Fwd flexion  40 ER  60-80 Abd    No strengthening/resisted motions of the shoulder until 12 weeks post-op    Exercise     RUE RTC repair  DOS 21  Follow protocol  Reps/ Time Weight/ Level Comments             Pulleys  4' flexion              Web  2'  Green     Shoulder retractions  10x10\"       UT Stretch  3x30\"                 Table slides flex  10x10\"       Table slides Scapt  10x10\"                 Pendulum  x30       Wall walks  10x10\"           Manual: RUE PROM flex, abd, ER up to ROM limits per protocol.      Vasocompression to RUE shoulder, LLE knee x15 min pressure     Treatment Charges: Mins Units   []  Modalities     [x]  Ther Exercise 30 2   []  Manual Therapy     []  Ther Activities []  Aquatics     [x]  Vasocompression 15 1   []  Other     Total Treatment time 45 3       Assessment: [x] Progressing toward goals. Continued with ROM along protocol, encouraged patient not to push his ROM more than allowed at this time. [] No change. [] Other:  [x] Patient would continue to benefit from skilled physical therapy services in order to: increase strength and ROM to improve function with ADLs.    Goals  MET NOT MET ON-  GOING  Details   Date Addressed:            STG: To be met in 10 treatments  ?          1. ? Pain: Decrease pain levels to 3/10 with ADLs  []? ?  []??  []??      2. ? ROM: Increase flexibility and AROM limitations throughout to equal bilat to reduce difficulty with ADLs []? ?  []??  []??      3. ? Strength: Increase MMT to 5/5 throughout to ease functional limitations and mobility  []? ?  []??  []??      4. Independent with Home Exercise Programs []? ?  []??  []??        []? ?  []??  []??      Date Addressed:            LTG: To be met in 20 treatments           1. Improve score on assessment tool Quick DASH from 45% impairment to less than 20% impairment  []??  []??  []??      2. Reduce pain levels to 0-1/10 or less with ADLs []? ?  []??  []??        []? ?  []??  []??                        Patient goals: improve mobility       Pt. Education:  [x] Yes  [] No  [x] Reviewed Prior HEP/Ed:Discussed  importance of remaining within protocol with ROM   Method of Education: [x] Verbal  [] Demo  [] Written  Comprehension of Education:  [x] Verbalizes understanding. [] Demonstrates understanding. [] Needs review. [x] Demonstrates/verbalizes HEP/Ed previously given. Plan: [x] Continue current frequency toward long and short term goals. [x] Specific Instructions for subsequent treatments: continue progressions per protocol.         Time In: 0900             Time Out: 1000        Electronically signed by:  Poly Edmond, PT

## 2021-10-14 ENCOUNTER — HOSPITAL ENCOUNTER (OUTPATIENT)
Dept: PHYSICAL THERAPY | Facility: CLINIC | Age: 66
Setting detail: THERAPIES SERIES
Discharge: HOME | End: 2021-10-14
Payer: COMMERCIAL

## 2021-10-14 PROCEDURE — 97110 THERAPEUTIC EXERCISES: CPT

## 2021-10-14 PROCEDURE — 97016 VASOPNEUMATIC DEVICE THERAPY: CPT

## 2021-10-14 NOTE — FLOWSHEET NOTE
[x] [facility]  Outpatient Rehabilitation &  Therapy  [facility]   [place]: [phone]  F: [phone]      Physical Therapy Daily Treatment Note    Date:  10/14/2021  Patient Name:  Thane Moritz    :  1955  MRN: [id number]  Physician: Dr Donohue Fall: Lyly Johansen # of visits allowed/remaining: Based on Med Nec  Medical Diagnosis: RUE RTC Repair                      Rehab Codes: M62.81 (Muscle Weakness), M62.9 (Disorder of Muscle), M79.1 (Myalgia), Z73.6 (Limitation of ADLs)   Onset Date: 3-2021                                                   Next 's appt. : 21    Visit# / total visits:      Cancels/No Shows:     Subjective:    Pain:  [] Yes  [x] No Location: RUE  Pain Rating: (0-10 scale) 2/10  Pain altered Tx:  [x] No  [] Yes  Action:  Comments:     Objective:  Exercises: 11 weeks on   , 12 weeks will be 10/21/21    6-12 weeks ROM protocol:   140 - Fwd flexion  40 ER  60-80 Abd    No strengthening/resisted motions of the shoulder until 12 weeks post-op    Exercise     RUE RTC repair  DOS 21  Follow protocol  Reps/ Time Weight/ Level Comments             Pulleys  4' flexion              Web  2'  Green     Shoulder retractions  10x10\"       UT Stretch  3x30\"                 Table slides flex  10x10\"       Table slides Scapt  10x10\"                 Pendulum  x30       Wall walks  10x10\"           Manual: RUE PROM flex, abd, ER up to ROM limits per protocol. Vasocompression to RUE shoulder, LLE knee x15 min pressure     Treatment Charges: Mins Units   []  Modalities     [x]  Ther Exercise 30 2   []  Manual Therapy     []  Ther Activities     []  Aquatics     [x]  Vasocompression 15 1   []  Other     Total Treatment time 45 3       Assessment: [x] Progressing toward goals. Continued with ROM within protocol. Increased tightness noted in R pec this date, decreased post manual.     [] No change.      [] Other:  [x] Patient would continue to benefit from skilled physical therapy services in order to: increase strength and ROM to improve function with ADLs.    Goals  MET NOT MET ON-  GOING  Details   Date Addressed:            STG: To be met in 10 treatments  ?          1. ? Pain: Decrease pain levels to 3/10 with ADLs  []? ?  []??  []??      2. ? ROM: Increase flexibility and AROM limitations throughout to equal bilat to reduce difficulty with ADLs []? ?  []??  []??      3. ? Strength: Increase MMT to 5/5 throughout to ease functional limitations and mobility  []? ?  []??  []??      4. Independent with Home Exercise Programs []? ?  []??  []??        []? ?  []??  []??      Date Addressed:            LTG: To be met in 20 treatments           1. Improve score on assessment tool Quick DASH from 45% impairment to less than 20% impairment  []??  []??  []??      2. Reduce pain levels to 0-1/10 or less with ADLs []? ?  []??  []??        []? ?  []??  []??                        Patient goals: improve mobility       Pt. Education:  [x] Yes  [] No  [x] Reviewed Prior HEP/Ed:Discussed  importance of remaining within protocol with ROM   Method of Education: [x] Verbal  [] Demo  [] Written  Comprehension of Education:  [x] Verbalizes understanding. [] Demonstrates understanding. [] Needs review. [x] Demonstrates/verbalizes HEP/Ed previously given. Plan: [x] Continue current frequency toward long and short term goals. [x] Specific Instructions for subsequent treatments: continue progressions per protocol.         Time In: 0900             Time Out: 1000        Electronically signed by:  Belkis Mancia PTA

## 2021-10-15 ENCOUNTER — APPOINTMENT (OUTPATIENT)
Dept: PHYSICAL THERAPY | Facility: CLINIC | Age: 66
End: 2021-10-15
Payer: COMMERCIAL

## 2021-10-15 ENCOUNTER — HOSPITAL ENCOUNTER (OUTPATIENT)
Dept: PREADMISSION TESTING | Age: 66
Discharge: HOME | End: 2021-10-19
Payer: COMMERCIAL

## 2021-10-15 VITALS
DIASTOLIC BLOOD PRESSURE: 90 MMHG | WEIGHT: 218.26 LBS | SYSTOLIC BLOOD PRESSURE: 140 MMHG | BODY MASS INDEX: 30.56 KG/M2 | TEMPERATURE: 98.2 F | HEART RATE: 77 BPM | OXYGEN SATURATION: 99 % | HEIGHT: 71 IN | RESPIRATION RATE: 16 BRPM

## 2021-10-15 LAB
ANION GAP SERPL CALCULATED.3IONS-SCNC: 13 MMOL/L (ref 9–17)
BUN BLDV-MCNC: 17 MG/DL (ref 8–23)
BUN/CREAT BLD: 19 (ref 9–20)
CALCIUM SERPL-MCNC: 9.4 MG/DL (ref 8.6–10.4)
CHLORIDE BLD-SCNC: 104 MMOL/L (ref 98–107)
CO2: 24 MMOL/L (ref 20–31)
CREAT SERPL-MCNC: 0.89 MG/DL (ref 0.7–1.2)
ESTIMATED AVERAGE GLUCOSE: 128 MG/DL
GFR AFRICAN AMERICAN: >60 ML/MIN
GFR NON-AFRICAN AMERICAN: >60 ML/MIN
GFR SERPL CREATININE-BSD FRML MDRD: ABNORMAL ML/MIN/{1.73_M2}
GFR SERPL CREATININE-BSD FRML MDRD: ABNORMAL ML/MIN/{1.73_M2}
GLUCOSE BLD-MCNC: 187 MG/DL (ref 70–99)
HBA1C MFR BLD: 6.1 % (ref 4–6)
HCT VFR BLD CALC: 41 % (ref 40.7–50.3)
HEMOGLOBIN: 12.9 G/DL (ref 13–17)
MCH RBC QN AUTO: 26.1 PG (ref 25.2–33.5)
MCHC RBC AUTO-ENTMCNC: 31.5 G/DL (ref 28.4–34.8)
MCV RBC AUTO: 83 FL (ref 82.6–102.9)
NRBC AUTOMATED: 0 PER 100 WBC
PDW BLD-RTO: 13.8 % (ref 11.8–14.4)
PLATELET # BLD: 223 K/UL (ref 138–453)
PMV BLD AUTO: 9.6 FL (ref 8.1–13.5)
POTASSIUM SERPL-SCNC: 4.3 MMOL/L (ref 3.7–5.3)
RBC # BLD: 4.94 M/UL (ref 4.21–5.77)
SODIUM BLD-SCNC: 141 MMOL/L (ref 135–144)
WBC # BLD: 5.4 K/UL (ref 3.5–11.3)

## 2021-10-15 PROCEDURE — 36415 COLL VENOUS BLD VENIPUNCTURE: CPT

## 2021-10-15 PROCEDURE — 83036 HEMOGLOBIN GLYCOSYLATED A1C: CPT

## 2021-10-15 PROCEDURE — 80048 BASIC METABOLIC PNL TOTAL CA: CPT

## 2021-10-15 PROCEDURE — 85027 COMPLETE CBC AUTOMATED: CPT

## 2021-10-15 ASSESSMENT — PAIN DESCRIPTION - FREQUENCY: FREQUENCY: INTERMITTENT

## 2021-10-15 ASSESSMENT — PAIN DESCRIPTION - ORIENTATION: ORIENTATION: LEFT

## 2021-10-15 ASSESSMENT — PAIN DESCRIPTION - PROGRESSION: CLINICAL_PROGRESSION: GRADUALLY WORSENING

## 2021-10-15 ASSESSMENT — PAIN DESCRIPTION - DESCRIPTORS: DESCRIPTORS: ACHING

## 2021-10-15 ASSESSMENT — PAIN DESCRIPTION - ONSET: ONSET: ON-GOING

## 2021-10-15 ASSESSMENT — PAIN DESCRIPTION - LOCATION: LOCATION: KNEE

## 2021-10-15 ASSESSMENT — PAIN DESCRIPTION - PAIN TYPE: TYPE: CHRONIC PAIN

## 2021-10-15 ASSESSMENT — PAIN SCALES - GENERAL: PAINLEVEL_OUTOF10: 1

## 2021-10-15 ASSESSMENT — PAIN - FUNCTIONAL ASSESSMENT: PAIN_FUNCTIONAL_ASSESSMENT: PREVENTS OR INTERFERES SOME ACTIVE ACTIVITIES AND ADLS

## 2021-10-15 NOTE — PRE-PROCEDURE INSTRUCTIONS
ARRIVE AT [facility] [address] ON Tuesday, October 19,2021 at 12:15 PM    Once you enter the hospital lobby, take the elevators to the second floor. Check-In is at the surgery registration desk. Continue to take your home medications as you normally do up to and including the night before surgery with the exception of any blood thinning medications. Please stop any blood thinning medications as directed by your surgeon or prescribing physician. Failure to stop certain medications may interfere with your scheduled surgery. These may include:  Aspirin, Warfarin (Coumadin), Clopidogrel (Plavix), Ibuprofen (Motrin, Advil), Naproxen (Aleve), Meloxicam (Mobic), Celecoxib (Celebrex), Eliquis, Pradaxa, Xarelto, Effient, Fish Oil, Herbal supplements. If you are diabetic, do not take any of your diabetic medications by mouth the morning of surgery. If you are taking insulin contact the doctor that manages your diabetes for instructions about any changes to your insulin dosages the day before surgery. Do not inject insulin or other injectable diabetic medications the morning of surgery unless otherwise instructed by the doctor who manages your diabetes. Please take the following medication(s) the day of surgery with a small sip of water:  prilosec        Please use your inhaler(s) if needed and bring your inhaler(s) from home the day of surgery. PREPARING FOR YOUR SURGERY:     Before surgery, you can play an important role in your own health. Because skin is not sterile, we need to be sure that your skin is as free of germs as possible before surgery by carefully washing before surgery. Preparing or prepping skin before surgery can reduce the risk of a surgical site infection.   Do not shave the area of your body where your surgery will be performed unless you received specific permission from your physician.     You will need to shower at home the night before surgery and the morning of surgery with a special soap called chlorhexidine gluconate (CHG*). *Not to be used by people allergic to Chlorhexidine Gluconate (CHG). Following these instructions will help you be sure that your skin is clean before surgery. Instructions on cleaning your skin before surgery: The night before your surgery:      You will need to shower with warm water (not hot) and the CHG soap.  Use a clean wash cloth and a clean towel. Have clean clothes available to put on after the shower.   First wash your hair with regular shampoo. Rinse your hair and body thoroughly to remove the shampoo.  Wash your face and genital area (private parts) with your regular soap or water only. Thoroughly rinse your body with warm water from the neck down.  Turn water off to prevent rinsing the soap off too soon.  With a clean wet washcloth and half of the CHG soap in the bottle, lather your entire body from the neck down. Do not use CHG soap near your eyes or ears to avoid injury to those areas.  Wash thoroughly, paying special attention to the area where your surgery will be performed.  Wash your body gently for five (5) minutes. Avoid scrubbing your skin too hard.  Turn the water back on and rinse your body thoroughly.  Pat yourself dry with a clean, soft towel. Do not apply lotion, cream or powder.  Dress with clean freshly washed clothes. The morning of surgery:     Repeat shower following steps above - using remaining half of CHG soap in bottle. Patient Instructions:    Courtenay Spurling If you are having any type of anesthesia you are to have nothing to eat or drink after midnight the night before your surgery. This includes gum, hard candy, mints, water or smoking or chewing tobacco.  The only exception to this is a small sip of water to take with any morning dose of heart, blood pressure, or seizure medications. No alcoholic beverages for 24 hours prior to surgery.      Brush your teeth but do not swallow water.  Bring your eyeglasses and case with you. No contacts are to be worn the day of surgery. You also may bring your hearing aids. Most surgical procedures involving anesthesia will require that you remove your dentures prior to surgery. · Do not wear any jewelry or body piercings day of surgery. Also, NO lotion, perfume or deodorant to be used the day of surgery. No nail polish on the operative extremity (arm/leg surgeries)     Please wear loose, comfortable clothing. If you are potentially going to have a cast or brace bring clothing that will fit over them.  In case of illness - If you have cold or flu like symptoms (high fever, runny nose, sore throat, cough, etc.) rash, nausea, vomiting, loose stools, and/or recent contact with someone who has a contagious disease (chicken pox, measles, etc.) Please call your doctor before coming to the hospital.         Day of Surgery/Procedure:    As a patient at [facility] you can expect quality medical and nursing care that is centered on your individual needs. Our goal is to make your surgical experience as comfortable as possible    . Transportation After Your Surgery/Procedure: You will need a friend or family member to drive you home after your procedure. Your  must be 25years of age or older and able to sign off on your discharge instructions. A taxi cab or any other form of public transportation is not acceptable. Your friend or family member must stay at the hospital throughout your procedure. Someone must remain with you for the first 24 hours after your surgery if you receive anesthesia or medication. If you do not have someone to stay with you, your procedure may be cancelled.       If you have any other questions regarding your procedure or the day of surgery, please call [phone]      _________________________  ____________________________  Signature (Patient)              Signature (Provider) & date

## 2021-10-19 ENCOUNTER — ANESTHESIA (OUTPATIENT)
Dept: OPERATING ROOM | Age: 66
End: 2021-10-19
Payer: COMMERCIAL

## 2021-10-19 ENCOUNTER — ANESTHESIA EVENT (OUTPATIENT)
Dept: OPERATING ROOM | Age: 66
End: 2021-10-19
Payer: COMMERCIAL

## 2021-10-19 ENCOUNTER — HOSPITAL ENCOUNTER (OUTPATIENT)
Age: 66
Setting detail: OUTPATIENT SURGERY
Discharge: HOME | End: 2021-10-19
Attending: ORTHOPAEDIC SURGERY | Admitting: ORTHOPAEDIC SURGERY
Payer: COMMERCIAL

## 2021-10-19 VITALS
HEART RATE: 81 BPM | TEMPERATURE: 97.3 F | HEIGHT: 71 IN | BODY MASS INDEX: 30.06 KG/M2 | DIASTOLIC BLOOD PRESSURE: 91 MMHG | SYSTOLIC BLOOD PRESSURE: 147 MMHG | WEIGHT: 214.7 LBS | OXYGEN SATURATION: 97 % | RESPIRATION RATE: 12 BRPM

## 2021-10-19 VITALS — TEMPERATURE: 97.7 F | SYSTOLIC BLOOD PRESSURE: 113 MMHG | OXYGEN SATURATION: 98 % | DIASTOLIC BLOOD PRESSURE: 66 MMHG

## 2021-10-19 LAB
GLUCOSE BLD-MCNC: 141 MG/DL (ref 75–110)
GLUCOSE BLD-MCNC: 165 MG/DL (ref 75–110)

## 2021-10-19 PROCEDURE — 7100000011 HC PHASE II RECOVERY - ADDTL 15 MIN: Performed by: ORTHOPAEDIC SURGERY

## 2021-10-19 PROCEDURE — 2580000003 HC RX 258: Performed by: ANESTHESIOLOGY

## 2021-10-19 PROCEDURE — 7100000000 HC PACU RECOVERY - FIRST 15 MIN: Performed by: ORTHOPAEDIC SURGERY

## 2021-10-19 PROCEDURE — 82947 ASSAY GLUCOSE BLOOD QUANT: CPT

## 2021-10-19 PROCEDURE — 7100000001 HC PACU RECOVERY - ADDTL 15 MIN: Performed by: ORTHOPAEDIC SURGERY

## 2021-10-19 PROCEDURE — 2709999900 HC NON-CHARGEABLE SUPPLY: Performed by: ORTHOPAEDIC SURGERY

## 2021-10-19 PROCEDURE — 3700000000 HC ANESTHESIA ATTENDED CARE: Performed by: ORTHOPAEDIC SURGERY

## 2021-10-19 PROCEDURE — 7100000010 HC PHASE II RECOVERY - FIRST 15 MIN: Performed by: ORTHOPAEDIC SURGERY

## 2021-10-19 PROCEDURE — 2500000003 HC RX 250 WO HCPCS: Performed by: ORTHOPAEDIC SURGERY

## 2021-10-19 PROCEDURE — 2580000003 HC RX 258: Performed by: ORTHOPAEDIC SURGERY

## 2021-10-19 PROCEDURE — 2500000003 HC RX 250 WO HCPCS: Performed by: ANESTHESIOLOGY

## 2021-10-19 PROCEDURE — 6360000002 HC RX W HCPCS: Performed by: NURSE ANESTHETIST, CERTIFIED REGISTERED

## 2021-10-19 PROCEDURE — 2500000003 HC RX 250 WO HCPCS: Performed by: NURSE ANESTHETIST, CERTIFIED REGISTERED

## 2021-10-19 PROCEDURE — 3700000001 HC ADD 15 MINUTES (ANESTHESIA): Performed by: ORTHOPAEDIC SURGERY

## 2021-10-19 PROCEDURE — 3600000003 HC SURGERY LEVEL 3 BASE: Performed by: ORTHOPAEDIC SURGERY

## 2021-10-19 PROCEDURE — 6360000002 HC RX W HCPCS: Performed by: ORTHOPAEDIC SURGERY

## 2021-10-19 PROCEDURE — 2720000010 HC SURG SUPPLY STERILE: Performed by: ORTHOPAEDIC SURGERY

## 2021-10-19 PROCEDURE — 64447 NJX AA&/STRD FEMORAL NRV IMG: CPT | Performed by: ANESTHESIOLOGY

## 2021-10-19 PROCEDURE — 2580000003 HC RX 258: Performed by: NURSE ANESTHETIST, CERTIFIED REGISTERED

## 2021-10-19 PROCEDURE — 3600000013 HC SURGERY LEVEL 3 ADDTL 15MIN: Performed by: ORTHOPAEDIC SURGERY

## 2021-10-19 RX ORDER — SODIUM CHLORIDE, SODIUM LACTATE, POTASSIUM CHLORIDE, CALCIUM CHLORIDE 600; 310; 30; 20 MG/100ML; MG/100ML; MG/100ML; MG/100ML
INJECTION, SOLUTION INTRAVENOUS CONTINUOUS
Status: DISCONTINUED | OUTPATIENT
Start: 2021-10-19 | End: 2021-10-19 | Stop reason: HOSPADM

## 2021-10-19 RX ORDER — ONDANSETRON 2 MG/ML
4 INJECTION INTRAMUSCULAR; INTRAVENOUS
Status: DISCONTINUED | OUTPATIENT
Start: 2021-10-19 | End: 2021-10-19 | Stop reason: HOSPADM

## 2021-10-19 RX ORDER — SODIUM CHLORIDE 0.9 % (FLUSH) 0.9 %
10 SYRINGE (ML) INJECTION PRN
Status: DISCONTINUED | OUTPATIENT
Start: 2021-10-19 | End: 2021-10-19 | Stop reason: HOSPADM

## 2021-10-19 RX ORDER — LIDOCAINE HYDROCHLORIDE 20 MG/ML
INJECTION, SOLUTION EPIDURAL; INFILTRATION; INTRACAUDAL; PERINEURAL PRN
Status: DISCONTINUED | OUTPATIENT
Start: 2021-10-19 | End: 2021-10-19 | Stop reason: SDUPTHER

## 2021-10-19 RX ORDER — BUPIVACAINE HYDROCHLORIDE AND EPINEPHRINE 5; 5 MG/ML; UG/ML
INJECTION, SOLUTION EPIDURAL; INTRACAUDAL; PERINEURAL PRN
Status: DISCONTINUED | OUTPATIENT
Start: 2021-10-19 | End: 2021-10-19 | Stop reason: ALTCHOICE

## 2021-10-19 RX ORDER — HYDROMORPHONE HYDROCHLORIDE 1 MG/ML
0.25 INJECTION, SOLUTION INTRAMUSCULAR; INTRAVENOUS; SUBCUTANEOUS EVERY 5 MIN PRN
Status: DISCONTINUED | OUTPATIENT
Start: 2021-10-19 | End: 2021-10-19 | Stop reason: HOSPADM

## 2021-10-19 RX ORDER — DEXAMETHASONE SODIUM PHOSPHATE 10 MG/ML
INJECTION INTRAMUSCULAR; INTRAVENOUS PRN
Status: DISCONTINUED | OUTPATIENT
Start: 2021-10-19 | End: 2021-10-19 | Stop reason: SDUPTHER

## 2021-10-19 RX ORDER — EPHEDRINE SULFATE/0.9% NACL/PF 50 MG/5 ML
SYRINGE (ML) INTRAVENOUS PRN
Status: DISCONTINUED | OUTPATIENT
Start: 2021-10-19 | End: 2021-10-19 | Stop reason: SDUPTHER

## 2021-10-19 RX ORDER — SODIUM CHLORIDE 9 MG/ML
INJECTION, SOLUTION INTRAVENOUS CONTINUOUS
Status: DISCONTINUED | OUTPATIENT
Start: 2021-10-19 | End: 2021-10-19 | Stop reason: HOSPADM

## 2021-10-19 RX ORDER — ONDANSETRON 2 MG/ML
INJECTION INTRAMUSCULAR; INTRAVENOUS PRN
Status: DISCONTINUED | OUTPATIENT
Start: 2021-10-19 | End: 2021-10-19 | Stop reason: SDUPTHER

## 2021-10-19 RX ORDER — SODIUM CHLORIDE 9 MG/ML
25 INJECTION, SOLUTION INTRAVENOUS PRN
Status: DISCONTINUED | OUTPATIENT
Start: 2021-10-19 | End: 2021-10-19 | Stop reason: HOSPADM

## 2021-10-19 RX ORDER — KETOROLAC TROMETHAMINE 30 MG/ML
INJECTION, SOLUTION INTRAMUSCULAR; INTRAVENOUS PRN
Status: DISCONTINUED | OUTPATIENT
Start: 2021-10-19 | End: 2021-10-19 | Stop reason: SDUPTHER

## 2021-10-19 RX ORDER — LIDOCAINE HYDROCHLORIDE 10 MG/ML
1 INJECTION, SOLUTION EPIDURAL; INFILTRATION; INTRACAUDAL; PERINEURAL
Status: DISCONTINUED | OUTPATIENT
Start: 2021-10-19 | End: 2021-10-19 | Stop reason: HOSPADM

## 2021-10-19 RX ORDER — BUPIVACAINE HYDROCHLORIDE 5 MG/ML
INJECTION, SOLUTION EPIDURAL; INTRACAUDAL
Status: COMPLETED | OUTPATIENT
Start: 2021-10-19 | End: 2021-10-19

## 2021-10-19 RX ORDER — PROPOFOL 10 MG/ML
INJECTION, EMULSION INTRAVENOUS PRN
Status: DISCONTINUED | OUTPATIENT
Start: 2021-10-19 | End: 2021-10-19 | Stop reason: SDUPTHER

## 2021-10-19 RX ORDER — SODIUM CHLORIDE, SODIUM LACTATE, POTASSIUM CHLORIDE, CALCIUM CHLORIDE 600; 310; 30; 20 MG/100ML; MG/100ML; MG/100ML; MG/100ML
INJECTION, SOLUTION INTRAVENOUS CONTINUOUS PRN
Status: DISCONTINUED | OUTPATIENT
Start: 2021-10-19 | End: 2021-10-19 | Stop reason: SDUPTHER

## 2021-10-19 RX ORDER — MAGNESIUM HYDROXIDE 1200 MG/15ML
LIQUID ORAL CONTINUOUS PRN
Status: COMPLETED | OUTPATIENT
Start: 2021-10-19 | End: 2021-10-19

## 2021-10-19 RX ORDER — FENTANYL CITRATE 50 UG/ML
INJECTION, SOLUTION INTRAMUSCULAR; INTRAVENOUS PRN
Status: DISCONTINUED | OUTPATIENT
Start: 2021-10-19 | End: 2021-10-19 | Stop reason: SDUPTHER

## 2021-10-19 RX ORDER — FENTANYL CITRATE 50 UG/ML
25 INJECTION, SOLUTION INTRAMUSCULAR; INTRAVENOUS EVERY 5 MIN PRN
Status: DISCONTINUED | OUTPATIENT
Start: 2021-10-19 | End: 2021-10-19 | Stop reason: HOSPADM

## 2021-10-19 RX ORDER — SODIUM CHLORIDE 0.9 % (FLUSH) 0.9 %
10 SYRINGE (ML) INJECTION EVERY 12 HOURS SCHEDULED
Status: DISCONTINUED | OUTPATIENT
Start: 2021-10-19 | End: 2021-10-19 | Stop reason: HOSPADM

## 2021-10-19 RX ADMIN — Medication 50 MCG: at 14:16

## 2021-10-19 RX ADMIN — CEFAZOLIN 2000 MG: 10 INJECTION, POWDER, FOR SOLUTION INTRAVENOUS at 14:24

## 2021-10-19 RX ADMIN — SODIUM CHLORIDE, POTASSIUM CHLORIDE, SODIUM LACTATE AND CALCIUM CHLORIDE: 600; 310; 30; 20 INJECTION, SOLUTION INTRAVENOUS at 13:18

## 2021-10-19 RX ADMIN — DEXAMETHASONE SODIUM PHOSPHATE 10 MG: 10 INJECTION INTRAMUSCULAR; INTRAVENOUS at 14:20

## 2021-10-19 RX ADMIN — ONDANSETRON 4 MG: 2 INJECTION, SOLUTION INTRAMUSCULAR; INTRAVENOUS at 14:47

## 2021-10-19 RX ADMIN — KETOROLAC TROMETHAMINE 30 MG: 30 INJECTION, SOLUTION INTRAMUSCULAR; INTRAVENOUS at 15:23

## 2021-10-19 RX ADMIN — Medication 50 MCG: at 14:47

## 2021-10-19 RX ADMIN — Medication 10 MG: at 14:51

## 2021-10-19 RX ADMIN — BUPIVACAINE HYDROCHLORIDE 20 ML: 5 INJECTION, SOLUTION EPIDURAL; INTRACAUDAL; PERINEURAL at 13:43

## 2021-10-19 RX ADMIN — LIDOCAINE HYDROCHLORIDE 100 MG: 20 INJECTION, SOLUTION EPIDURAL; INFILTRATION; INTRACAUDAL; PERINEURAL at 14:16

## 2021-10-19 RX ADMIN — PROPOFOL 170 MG: 10 INJECTION, EMULSION INTRAVENOUS at 14:16

## 2021-10-19 RX ADMIN — SODIUM CHLORIDE, POTASSIUM CHLORIDE, SODIUM LACTATE AND CALCIUM CHLORIDE: 600; 310; 30; 20 INJECTION, SOLUTION INTRAVENOUS at 14:13

## 2021-10-19 ASSESSMENT — PULMONARY FUNCTION TESTS
PIF_VALUE: 15
PIF_VALUE: 14
PIF_VALUE: 15
PIF_VALUE: 3
PIF_VALUE: 14
PIF_VALUE: 9
PIF_VALUE: 15
PIF_VALUE: 9
PIF_VALUE: 15
PIF_VALUE: 15
PIF_VALUE: 4
PIF_VALUE: 15
PIF_VALUE: 4
PIF_VALUE: 15
PIF_VALUE: 15
PIF_VALUE: 0
PIF_VALUE: 14
PIF_VALUE: 15
PIF_VALUE: 15
PIF_VALUE: 1
PIF_VALUE: 4
PIF_VALUE: 15
PIF_VALUE: 4
PIF_VALUE: 15
PIF_VALUE: 3
PIF_VALUE: 14
PIF_VALUE: 15
PIF_VALUE: 14
PIF_VALUE: 15
PIF_VALUE: 14
PIF_VALUE: 14
PIF_VALUE: 15
PIF_VALUE: 15
PIF_VALUE: 1
PIF_VALUE: 15
PIF_VALUE: 0
PIF_VALUE: 15
PIF_VALUE: 14
PIF_VALUE: 15
PIF_VALUE: 14
PIF_VALUE: 15
PIF_VALUE: 15
PIF_VALUE: 5
PIF_VALUE: 15
PIF_VALUE: 5
PIF_VALUE: 14
PIF_VALUE: 15
PIF_VALUE: 4
PIF_VALUE: 15
PIF_VALUE: 5
PIF_VALUE: 3
PIF_VALUE: 4
PIF_VALUE: 14
PIF_VALUE: 15
PIF_VALUE: 15
PIF_VALUE: 0
PIF_VALUE: 4
PIF_VALUE: 4
PIF_VALUE: 1
PIF_VALUE: 15
PIF_VALUE: 2
PIF_VALUE: 15
PIF_VALUE: 14
PIF_VALUE: 15
PIF_VALUE: 15
PIF_VALUE: 16
PIF_VALUE: 15
PIF_VALUE: 15

## 2021-10-19 ASSESSMENT — PAIN SCALES - GENERAL
PAINLEVEL_OUTOF10: 0

## 2021-10-19 ASSESSMENT — PAIN - FUNCTIONAL ASSESSMENT: PAIN_FUNCTIONAL_ASSESSMENT: 0-10

## 2021-10-19 ASSESSMENT — PAIN DESCRIPTION - DESCRIPTORS: DESCRIPTORS: SHARP

## 2021-10-19 NOTE — PROGRESS NOTES
Nerve block completed per Dr Tamiko Petersen. Pt tolerated well and is resting in bed with monitors on. Pt and pt spouse updated on plan of care and all questions answered and support provided. Will continue to monitor.

## 2021-10-19 NOTE — ANESTHESIA POSTPROCEDURE EVALUATION
Department of Anesthesiology  Postprocedure Note    Patient: Konstantin Severino  MRN: [id number]  YOB: 1955  Date of evaluation: 10/19/2021  Time:  4:38 PM     Procedure Summary     Date: 10/19/21 Room / Location: [facility] OR [address] [place] [address]    Anesthesia Start: 3114 Anesthesia Stop: 6829    Procedure: LEFT KNEE ARTHROSCOPY WITH PARTIAL MEDIAL MENISCECTOMY (Left Knee) Diagnosis: (DX ACUTE MEDIAL MENISCUS TEAR OF LEFT KNEE)    Surgeons: Lexie Shelton MD Responsible Provider: Mily Trujlilo MD    Anesthesia Type: regional, general ASA Status: 2          Anesthesia Type: regional, general    Don Phase I: Don Score: 8    Don Phase II:      Last vitals: Reviewed and per EMR flowsheets.        Anesthesia Post Evaluation    Comments: nac

## 2021-10-19 NOTE — ANESTHESIA PRE PROCEDURE
Historical Provider, MD       Current medications:    No current outpatient medications on file. No current facility-administered medications for this visit. Allergies: Allergies   Allergen Reactions    Meperidine Other (See Comments)     Urinary retention           Problem List:  There is no problem list on file for this patient. Past Medical History:        Diagnosis Date    Arthritis     Asthma     Chest pain     in 2018 had cardiac work up negative. [facility] cardiology states chest pain was from GERD    Colon polyp     [facility] [address]     Diabetes mellitus ([facility] [address]     type 2    GERD (gastroesophageal reflux disease)     Hyperlipidemia     Retention of urine        Past Surgical History:        Procedure Laterality Date    ANTERIOR CRUCIATE LIGAMENT REPAIR Right     APPENDECTOMY      BICEPS TENDON REPAIR Right     CARDIAC SURGERY      cardiac cath negative     COLONOSCOPY      polyp removed negative     EYE SURGERY      lasik    HERNIA REPAIR      bilat at 17    JOINT REPLACEMENT      bilat hips     SHOULDER ARTHROSCOPY Right 2021    RIGHT SHOULDER ARTHROSCOPY, ROTATOR CUFF REPAIR, ACROMIOPLASTY, DISTAL CLAVICAL EXCISION, OPEN SUBPEC BICEPS TENODESIS, AND SUPRASCAPULAR NERVE BLOCK performed by Adriana Aranda MD at [place] [address] History:    Social History     Tobacco Use    Smoking status: Former Smoker     Years: 20.00     Types: Cigarettes     Quit date: 2000     Years since quittin.8    Smokeless tobacco: Never Used   Substance Use Topics    Alcohol use: Yes     Comment: occasionally                                 Counseling given: Not Answered      Vital Signs (Current): There were no vitals filed for this visit.                                            BP Readings from Last 3 Encounters:   10/19/21 (!) 152/90   10/15/21 (!) 140/90   21 134/77       NPO Status: BMI:   Wt Readings from Last 3 Encounters:   10/19/21 214 lb 11.2 oz (97.4 kg)   10/15/21 218 lb 4.1 oz (99 kg)   07/29/21 215 lb (97.5 kg)     There is no height or weight on file to calculate BMI.    CBC:   Lab Results   Component Value Date    WBC 5.4 10/15/2021    RBC 4.94 10/15/2021    HGB 12.9 10/15/2021    HCT 41.0 10/15/2021    MCV 83.0 10/15/2021    RDW 13.8 10/15/2021     10/15/2021       CMP:   Lab Results   Component Value Date     10/15/2021    K 4.3 10/15/2021     10/15/2021    CO2 24 10/15/2021    BUN 17 10/15/2021    CREATININE 0.89 10/15/2021    GFRAA >60 10/15/2021    LABGLOM >60 10/15/2021    GLUCOSE 187 10/15/2021    PROT 7.3 02/15/2021    CALCIUM 9.4 10/15/2021    BILITOT 0.38 02/15/2021    ALKPHOS 81 02/15/2021    AST 21 02/15/2021    ALT 20 02/15/2021       POC Tests:   No results for input(s): POCGLU, POCNA, POCK, POCCL, POCBUN, POCHEMO, POCHCT in the last 72 hours.     Coags: No results found for: PROTIME, INR, APTT    HCG (If Applicable): No results found for: PREGTESTUR, PREGSERUM, HCG, HCGQUANT     ABGs: No results found for: PHART, PO2ART, MMR1NLB, FVA4OEP, BEART, W9GNYDPA     Type & Screen (If Applicable):  No results found for: LABABO, LABRH    Drug/Infectious Status (If Applicable):  No results found for: HIV, HEPCAB    COVID-19 Screening (If Applicable):   Lab Results   Component Value Date    COVID19 Not Detected 12/02/2020           Anesthesia Evaluation  Patient summary reviewed and Nursing notes reviewed no history of anesthetic complications:   Airway: Mallampati: II  TM distance: >3 FB   Neck ROM: full  Mouth opening: > = 3 FB Dental: normal exam         Pulmonary:normal exam    (+) COPD:  asthma:                            Cardiovascular:  Exercise tolerance: good (>4 METS),   (+) hyperlipidemia    (-) past MI and CAD        Rate: normal                    Neuro/Psych:               GI/Hepatic/Renal:   (+) GERD: well controlled,           Endo/Other:    (+) Diabetes, . Abdominal:             Vascular: Other Findings:             Anesthesia Plan      general     ASA 2     (Placement date 07/29/21; Placement time 1454; Preoxygenation Yes; Technique Direct laryngoscopy, Stylet; Type Cuffed; Tube size 7.5 mm; Laryngoscope Mac; Blade size 4; Location Oral; Grade view 2a; Insertion attempts 1; Atraumatic Yes)  Induction: intravenous. MIPS: Postoperative opioids intended and Prophylactic antiemetics administered. Anesthetic plan and risks discussed with patient. Plan discussed with CRNA.     Attending anesthesiologist reviewed and agrees with Preprocedure content              Narvis Sicard, MD   10/19/2021

## 2021-10-19 NOTE — OP NOTE
Operative Note      Patient: Brandy Dietz  YOB: 1955  MRN: [id number]    Date of Procedure: 10/19/2021    Pre-Op Diagnosis: LEFT MEDIAL MENISCUS TEAR    Post-Op Diagnosis:   1. LEFT MEDIAL MENISCUS TEAR  2. LEFT LATERAL MENISCUS PARAMENISCAL CYSTS        Procedures:   1. LEFT KNEE ARTHROSCOPY  2. PARTIAL MEDIAL MENISCECTOMY   3. DECOMPRESSION PARAMENISCAL CYST    Surgeon(s):  Lori Leyva MD    Assistant:   Resident: Vee Medellin DO; Andrew Mar DO    Anesthesia: General    Estimated Blood Loss (mL): 92BN    Complications: NONE    Specimens: NONE    Implants: NONE      Drains: NONE    Indication for operation:  Patient is a 61 y.o. male who presents for left knee arthroscopy. Pt has had left medial knee pain with mechanical symptoms and MRI confirmed meniscal tear. Patient has elected for operative intervention in the form of left knee arthroscopy with meniscectomy. Patient was met in the preoperative holding area and the correct extremity was identified and marked. Informed consent was obtained. The risks included but not limited to infection, bleeding, wound healing complication, pain, stiffness, need for further procedures were reviewed with the patient. Patient wished to proceed with surgery. Operative summary:  The patient was taken to the OR suite and transferred to the OR table from [facility]. The patient was then sedated an intubated by the anesthesia team without any complications. We placed the left lower extremity in the arthroscopic leg hernandez. The left lower extremity was prepped and draped in the usual sterile fashion. The patient received 2g Ancef for prophylactic abx. A time out was performed. The pt, procedure, and operative side was confirmed and all were in agreeance in OR. We then made our lateral viewing portal just lateral to the patella tendon with 11 blade. We inserted the arthroscopic into the knee and performed our diagnostic arthroscopy.  There were grade 2 degenerative changes to the patellofemoral and medial compartments. A complex medial meniscal tear was identified as well as parameniscal cyst at the margin of the anterior horn of lateral mensicus. We then established our medial working portal using a spinal needle followed by 11 blade. We inserted the probe into the medial compartment and probed the medial meniscus tear which was unstable and extended from the posterior horn to the midsagittal portion of meniscus. There was separation at the capsulare junction and complex radial component to tear. We proceeded to resect the unstable meniscus with a series of arthroscopic biters and shaver. We proceeded to then probe the remaining meniscus which was stable. We then turned our attention to power-lateral horn of the meniscus where we debrided the para-meniscal cyst with a shaver. The anterolateral horn of the meniscus and ACL attachment was intact although had significant fraying. We then proceeded to us an arthro care hemostasis. The fluid was then extravasated from the knee. Local anesthetic was injected into the portals and along medial joint line. Portals were closed with nylon suture. A soft sterile dressing was applied followed by ACE wrap from toes to thigh. The patient tolerated the procedure well had no immediate post op complications.  was present during all key portions procedure.     Bradley Najera DO PGY4  3:36 PM  10/19/21

## 2021-10-19 NOTE — H&P
Interval H&P Note    Pt Name: Torsten Delaney  MRN: [id number]  YOB: 1955  Date of evaluation: 10/19/2021      [x] I have reviewed the hard copy Orthopedic Surgery Progress Note by Dr. Elvin Tariq dated 10/6/2021 labeled in short chart which meets the criteria for an Interval History and Physical note. [x] I have examined  Torsten Delaney  There are no changes to the patient who is scheduled for a left knee arthroscopy with partial medial meniscectomy by Dr. Elvin Tariq for acute medial meniscus tear of left knee. The patient denies new health changes, fever, chills, wheezing, cough, increased SOB, chest pain, open sores or wounds. Hx diabetes. POC BS refer to epic. Last Multivitamin 10/18/2021 and ASA 81mg more than 7 days ago. Vital signs: BP (!) 152/90   Pulse 82   Temp 98.2 °F (36.8 °C) (Oral)   Resp 16   Ht 5' 11\" (1.803 m)   Wt 214 lb 11.2 oz (97.4 kg)   SpO2 97%   BMI 29.94 kg/m²     Allergies:  Meperidine    Medications:    Prior to Admission medications    Medication Sig Start Date End Date Taking?  Authorizing Provider   docusate sodium (COLACE) 100 MG capsule Take 1 capsule by mouth 2 times daily 7/29/21  Yes Natalie Varghese MD   cetirizine (ZYRTEC) 10 MG tablet Take 10 mg by mouth daily   Yes Historical Provider, MD   Multiple Vitamins-Minerals (CENTRUM SILVER) TABS Take 1 tablet by mouth daily   Yes Historical Provider, MD   albuterol sulfate HFA (VENTOLIN HFA) 108 (90 Base) MCG/ACT inhaler Inhale 2 puffs into the lungs every 6 hours as needed for Wheezing   Yes Historical Provider, MD   metFORMIN (GLUCOPHAGE-XR) 500 MG extended release tablet Take 500 mg by mouth Daily with supper    Yes Historical Provider, MD   fluticasone (FLONASE) 50 MCG/ACT nasal spray 1 spray by Each Nostril route daily   Yes Historical Provider, MD   atorvastatin (LIPITOR) 20 MG tablet Take 20 mg by mouth daily   Yes Historical Provider, MD   omeprazole (PRILOSEC) 40 MG delayed release capsule Take 40 mg by mouth daily Yes Historical Provider, MD   budesonide-formoterol (SYMBICORT) 160-4.5 MCG/ACT AERO Inhale 2 puffs into the lungs 2 times daily   Yes Historical Provider, MD   aspirin 81 MG chewable tablet Take 81 mg by mouth daily   Yes Historical Provider, MD   ondansetron (ZOFRAN) 4 MG tablet Take 1 tablet by mouth every 6 hours as needed for Nausea 7/29/21   Marbella Mooney MD   Diclofenac Sodium (VOLTAREN PO) Take 75 mg by mouth 2 times daily    Historical Provider, MD   Blood Glucose Monitoring Suppl (PRODIGY AUTOCODE BLOOD GLUCOSE) w/Device KIT Use as directed to test blood sugar daily    Historical Provider, MD         Past Medical History:     Past Medical History:   Diagnosis Date    Arthritis     Asthma     Chest pain     in 2018 had cardiac work up negative.   states cardiology states chest pain was from GERD    Colon polyp     [facility])     Diabetes mellitus ([facility] [address]     type 2    GERD (gastroesophageal reflux disease)     Hyperlipidemia     Retention of urine         Past Surgical History:     Past Surgical History:   Procedure Laterality Date    ANTERIOR CRUCIATE LIGAMENT REPAIR Right     APPENDECTOMY      BICEPS TENDON REPAIR Right     CARDIAC SURGERY      cardiac cath negative     COLONOSCOPY      polyp removed negative     EYE SURGERY      lasik    HERNIA REPAIR      bilat at 17    JOINT REPLACEMENT      bilat hips     SHOULDER ARTHROSCOPY Right 7/29/2021    RIGHT SHOULDER ARTHROSCOPY, ROTATOR CUFF REPAIR, ACROMIOPLASTY, DISTAL CLAVICAL EXCISION, OPEN SUBPEC BICEPS TENODESIS, AND SUPRASCAPULAR NERVE BLOCK performed by Marbella Mooney MD at [place] [address] History:     Social History     Socioeconomic History    Marital status:      Spouse name: None    Number of children: None    Years of education: None    Highest education level: None   Occupational History    None   Tobacco Use    Smoking status: Former Smoker 720 hours.     YUNIEL Hooker - CNP  Electronically signed 10/19/2021 at 1:01 PM

## 2021-10-19 NOTE — ANESTHESIA PROCEDURE NOTES
Peripheral Block    Patient location during procedure: pre-op  Start time: 10/19/2021 1:35 PM  End time: 10/19/2021 1:42 PM  Staffing  Performed: anesthesiologist   Anesthesiologist: Jennifer Flor MD  Preanesthetic Checklist  Completed: patient identified, IV checked, site marked, risks and benefits discussed, surgical consent, monitors and equipment checked, pre-op evaluation, timeout performed, anesthesia consent given, oxygen available and patient being monitored  Peripheral Block  Patient position: supine  Prep: ChloraPrep  Patient monitoring: continuous pulse ox, continuous capnometry, frequent blood pressure checks and IV access  Block type: Femoral  Laterality: left  Injection technique: single-shot  Guidance: ultrasound guided  Local infiltration: lidocaine  Infiltration strength: 1 %  Dose: 2 mL  Adductor canal  Provider prep: mask and sterile gloves  Local infiltration: lidocaine  Needle  Needle type: pencil-tip   Needle gauge: 20 G  Needle localization: ultrasound guidance  Assessment  Injection assessment: negative aspiration for heme, no paresthesia on injection and local visualized surrounding nerve on ultrasound  Paresthesia pain: none  Slow fractionated injection: yes  Hemodynamics: stable  Additional Notes  preprocedure ready time 1334  Medications Administered  Bupivacaine (MARCAINE) PF injection 0.5%, 20 mL  Reason for block: procedure for pain, post-op pain management and at surgeon's request

## 2021-10-19 NOTE — H&P
History and Physical Update    Pt Name: Ridge Ashraf  MRN: [id number]  YOB: 1955  Date of evaluation: 10/19/2021    [x] I have examined the patient and reviewed the H&P/Consult and there are no changes to the patient or plans.     [] I have examined the patient and reviewed the H&P/Consult and have noted the following changes:        Nat Huerta MD   Electronically signed 10/19/2021 at 2:11 PM

## 2021-10-22 ENCOUNTER — APPOINTMENT (OUTPATIENT)
Dept: PHYSICAL THERAPY | Facility: CLINIC | Age: 66
End: 2021-10-22
Payer: COMMERCIAL

## 2021-10-22 ENCOUNTER — HOSPITAL ENCOUNTER (OUTPATIENT)
Dept: PHYSICAL THERAPY | Facility: CLINIC | Age: 66
Setting detail: THERAPIES SERIES
Discharge: HOME | End: 2021-10-22
Payer: COMMERCIAL

## 2021-10-22 PROCEDURE — 97016 VASOPNEUMATIC DEVICE THERAPY: CPT

## 2021-10-22 PROCEDURE — 97140 MANUAL THERAPY 1/> REGIONS: CPT

## 2021-10-22 PROCEDURE — 97110 THERAPEUTIC EXERCISES: CPT

## 2021-10-22 NOTE — FLOWSHEET NOTE
[x] [facility]  Outpatient Rehabilitation &  Therapy  [facility]   [place]: [phone]  F: [phone]      Physical Therapy Daily Treatment Note    Date:  10/22/2021  Patient Name:  Chong Holden    :  1955  MRN: [id number]  Physician: Dr Lin Seal: Khloe Flakes # of visits allowed/remaining: Based on Med Nec  Medical Diagnosis: RUE RTC Repair                      Rehab Codes: M62.81 (Muscle Weakness), M62.9 (Disorder of Muscle), M79.1 (Myalgia), Z73.6 (Limitation of ADLs)   Onset Date: 3-2021                                                   Next 's appt. : 21    Visit# / total visits:      Cancels/No Shows:     Subjective:    Pain:  [] Yes  [x] No Location: RUE  Pain Rating: (0-10 scale) 2/10  Pain altered Tx:  [x] No  [] Yes  Action:  Comments: Pt with 2/10 pain in the RUE shoulder, 9/10 in the LLE knee where he had a scope done 2 days ago. Reports adherence to HEP at this time but pain with standing ex's due to knee surgery. Exercise     RUE RTC repair  DOS 21  Follow protocol  Reps/ Time Weight/ Level Comments             Pulleys  5' flexion              Table slides flex  5\"x20       Table slides Scapt  5\"x20       Wall walks forward  10\"x20       Wall walks Abd   10\"x20     Supine punches  3x10     Supine ABCs  x2           Isometrics Shoulder:      flex 10\"x10     Abd 10\"x10     Ext 10\"x10     ER/IR                            Manual: RUE PROM all planes shoulder    Gameready vasocompression to RUE shoulder, LLE knee mild pressure x15'      RUE A/AAROM:  Flex 150/158  Abd 165/170  ER 70/85  IR 50/65        Treatment Charges: Mins Units   []  Modalities     [x]  Ther Exercise 30 2   []  Manual Therapy 15 1   []  Ther Activities     []  Aquatics     [x]  Vasocompression 15 1   []  Other     Total Treatment time 60 4       Assessment: [x] Progressing toward goals.  Continued with ROM and stretching to progress patient closer to full ROM. Added isometrics and supine scap stabilization ex's to improve overall mobility and begin progression of strength training. [] No change. [] Other:  [x] Patient would continue to benefit from skilled physical therapy services in order to: increase strength and ROM to improve function with ADLs.    Goals  MET NOT MET ON-  GOING  Details   Date Addressed:            STG: To be met in 10 treatments  ?          1. ? Pain: Decrease pain levels to 3/10 with ADLs  []? ?  []??  []??      2. ? ROM: Increase flexibility and AROM limitations throughout to equal bilat to reduce difficulty with ADLs []? ?  []??  []??      3. ? Strength: Increase MMT to 5/5 throughout to ease functional limitations and mobility  []? ?  []??  []??      4. Independent with Home Exercise Programs []? ?  []??  []??        []? ?  []??  []??      Date Addressed:            LTG: To be met in 20 treatments           1. Improve score on assessment tool Quick DASH from 45% impairment to less than 20% impairment  []??  []??  []??      2. Reduce pain levels to 0-1/10 or less with ADLs []? ?  []??  []??        []? ?  []??  []??                        Patient goals: improve mobility       Pt. Education:  [x] Yes  [] No  [x] Reviewed Prior HEP/Ed:Discussed and given new isometrics for HEP    Method of Education: [x] Verbal  [] Demo  [] Written  Comprehension of Education:  [x] Verbalizes understanding. [] Demonstrates understanding. [] Needs review. [x] Demonstrates/verbalizes HEP/Ed previously given. Access Code: [email or web address]  URL: [email or web address]  Date: 10/22/2021  Prepared by: Scarlett Fast    Exercises  Seated Shoulder Flexion Towel Slide at Table Top - 1 x daily - 7 x weekly - 3 sets - 10 reps  Seated Shoulder Scaption Slide at Table Top with Forearm in Neutral - 1 x daily - 7 x weekly - 3 sets - 10 reps  Supine Single Arm Shoulder Protraction - 1 x daily - 7 x weekly - 3 sets - 10 reps  Supine Shoulder Alphabet - 1 x daily - 7 x weekly - 3 sets - 2 reps  Isometric Shoulder Flexion at Wall - 1 x daily - 7 x weekly - 3 sets - 10 reps - 10 (sec) hold  Isometric Shoulder Abduction at Wall - 1 x daily - 7 x weekly - 3 sets - 10 reps - 10 (sec) hold  Isometric Shoulder Extension at Wall - 1 x daily - 7 x weekly - 3 sets - 10 reps - 10 (sec) hold      Plan: [x] Continue current frequency toward long and short term goals. [x] Specific Instructions for subsequent treatments: continue progressions per protocol.         Time In: 0900             Time Out: 1008        Electronically signed by:  Eliazar Carreon, PT

## 2021-10-27 ENCOUNTER — HOSPITAL ENCOUNTER (OUTPATIENT)
Dept: PHYSICAL THERAPY | Facility: CLINIC | Age: 66
Setting detail: THERAPIES SERIES
Discharge: HOME | End: 2021-10-27
Payer: COMMERCIAL

## 2021-10-27 PROCEDURE — 97110 THERAPEUTIC EXERCISES: CPT

## 2021-10-27 PROCEDURE — 97016 VASOPNEUMATIC DEVICE THERAPY: CPT

## 2021-10-27 PROCEDURE — 97140 MANUAL THERAPY 1/> REGIONS: CPT

## 2021-10-27 NOTE — FLOWSHEET NOTE
[x] [facility]  Outpatient Rehabilitation &  Therapy  [facility]   [place]: [phone]  F: [phone]      Physical Therapy Daily Treatment Note    Date:  10/27/2021  Patient Name:  Barrie Nair    :  1955  MRN: [id number]  Physician: Dr Maggie Aguirret: Clemente Dominguez # of visits allowed/remaining: Based on Med Nec  Medical Diagnosis: RUE RTC Repair                      Rehab Codes: M62.81 (Muscle Weakness), M62.9 (Disorder of Muscle), M79.1 (Myalgia), Z73.6 (Limitation of ADLs)   Onset Date: 3-2021                                                   Next 's appt. : 21     Visit# / total visits:      Cancels/No Shows:     Subjective:    Pain:  [] Yes  [x] No Location: RUE  Pain Rating: (0-10 scale) 2/10  Pain altered Tx:  [x] No  [] Yes  Action:  Comments: Patient arrived noting minor soreness in shoulder, continued knee pain. Exercise     RUE RTC repair  DOS 21  Follow protocol  Reps/ Time Weight/ Level Comments             Pulleys  5' flexion              Table slides flex  5\"x20       Table slides Scapt  5\"x20       Wall walks forward  10\"x20       Wall walks Abd   10\"x20     Supine punches  3x10     Supine ABCs  x2     Wand stretches  10x10\"                 Isometrics Shoulder:      flex 10\"x10     Abd 10\"x10     Ext 10\"x10     ER/IR 10\"x10                           Manual: RUE PROM all planes shoulder    Gameready vasocompression to RUE shoulder, LLE knee mild pressure x15'      RUE A/AAROM:  Flex 150/158  Abd 165/170  ER 70/85  IR 50/65      Treatment Charges: Mins Units   []  Modalities     [x]  Ther Exercise 30 2   [x]  Manual Therapy 15 1   []  Ther Activities     []  Aquatics     [x]  Vasocompression 15 1   []  Other     Total Treatment time 60 4       Assessment: [x] Progressing toward goals. Progressed strength and ROM program this date. Patient notes fatigue with no complaint of associated pain.  Will continue progression per protocol. [] No change. [] Other:  [x] Patient would continue to benefit from skilled physical therapy services in order to: increase strength and ROM to improve function with ADLs.    Goals  MET NOT MET ON-  GOING  Details   Date Addressed:            STG: To be met in 10 treatments  ?          1. ? Pain: Decrease pain levels to 3/10 with ADLs  []? ?  []??  []??      2. ? ROM: Increase flexibility and AROM limitations throughout to equal bilat to reduce difficulty with ADLs []? ?  []??  []??      3. ? Strength: Increase MMT to 5/5 throughout to ease functional limitations and mobility  []? ?  []??  []??      4. Independent with Home Exercise Programs []? ?  []??  []??        []? ?  []??  []??      Date Addressed:            LTG: To be met in 20 treatments           1. Improve score on assessment tool Quick DASH from 45% impairment to less than 20% impairment  []??  []??  []??      2. Reduce pain levels to 0-1/10 or less with ADLs []? ?  []??  []??        []? ?  []??  []??                        Patient goals: improve mobility       Pt. Education:  [x] Yes  [] No  [x] Reviewed Prior HEP/Ed:Discussed and given new isometrics for HEP    Method of Education: [x] Verbal  [] Demo  [] Written  Comprehension of Education:  [x] Verbalizes understanding. [] Demonstrates understanding. [] Needs review. [x] Demonstrates/verbalizes HEP/Ed previously given. Plan: [x] Continue current frequency toward long and short term goals. [x] Specific Instructions for subsequent treatments: continue progressions per protocol.         Time In: 4378             Time Out: 1100    Electronically signed by:  Mya Rodriguez PTA

## 2021-10-29 ENCOUNTER — HOSPITAL ENCOUNTER (OUTPATIENT)
Dept: PHYSICAL THERAPY | Facility: CLINIC | Age: 66
Setting detail: THERAPIES SERIES
Discharge: HOME | End: 2021-10-29
Payer: COMMERCIAL

## 2021-10-29 PROCEDURE — 97016 VASOPNEUMATIC DEVICE THERAPY: CPT

## 2021-10-29 PROCEDURE — 97140 MANUAL THERAPY 1/> REGIONS: CPT

## 2021-10-29 PROCEDURE — 97110 THERAPEUTIC EXERCISES: CPT

## 2021-10-29 NOTE — FLOWSHEET NOTE
[x] [facility]  Outpatient Rehabilitation &  Therapy  [facility]   [place]: [phone]  F: [phone]      Physical Therapy Daily Treatment Note    Date:  10/29/2021  Patient Name:  Ervin Johansen    :  1955  MRN: [id number]  Physician: Dr Henderson Milder: Evelyne Aguilar # of visits allowed/remaining: Based on Med Nec  Medical Diagnosis: RUE RTC Repair                      Rehab Codes: M62.81 (Muscle Weakness), M62.9 (Disorder of Muscle), M79.1 (Myalgia), Z73.6 (Limitation of ADLs)   Onset Date: 3-2021                                                   Next 's appt. : 21     Visit# / total visits: 10/20     Cancels/No Shows:     Subjective:    Pain:  [] Yes  [x] No Location: RUE  Pain Rating: (0-10 scale) 2/10  Pain altered Tx:  [x] No  [] Yes  Action:  Comments: Patient reports significant achiness and tightness with ROM, a little more than usual.  States his R knee is painful, especially with standing while doing exercises.       Exercise     RUE RTC repair  DOS 21  Follow protocol  Reps/ Time  Weight/ Level Comments              Pulleys  5' x flexion               Table slides flex  5\"x20 x       Table slides Scapt  5\"x20 x       Wall walks forward  10\"x20 x       Wall walks Abd   10\"x20 x     Supine punches  3x10      Supine ABCs  x2 x     Wand stretches  10x10\"   Flex, abd, er                 Isometrics Shoulder:       flex 10\"x10 x     Abd 10\"x10 x     Ext 10\"x10 x     ER/IR 10\"x10 x  Added this date in doorway, with towel roll at side                            Manual: RUE PROM all planes shoulder    Gameready vasocompression to RUE shoulder, LLE knee medium pressure, 34 degrees x15'      RUE A/AAROM:  Flex 150/158  Abd 165/170  ER 70/85  IR 50/65      Treatment Charges: Mins Units   []  Modalities     [x]  Ther Exercise 30 2   [x]  Manual Therapy 15 1   []  Ther Activities     []  Aquatics     [x]  Vasocompression 15 1 []  Other     Total Treatment time 60 4       Assessment: [x] Progressing toward goals. Able to add IR/ER isometrics in doorway for increased strength with good tolerance. Progressed vasocompression to medium pressure per patient request.   Will continue progression per protocol. [] No change. [] Other:  [x] Patient would continue to benefit from skilled physical therapy services in order to: increase strength and ROM to improve function with ADLs.    Goals  MET NOT MET ON-  GOING  Details   Date Addressed:            STG: To be met in 10 treatments  ?          1. ? Pain: Decrease pain levels to 3/10 with ADLs  []? ?  []??  []??      2. ? ROM: Increase flexibility and AROM limitations throughout to equal bilat to reduce difficulty with ADLs []? ?  []??  []??      3. ? Strength: Increase MMT to 5/5 throughout to ease functional limitations and mobility  []? ?  []??  []??      4. Independent with Home Exercise Programs []? ?  []??  []??        []? ?  []??  []??      Date Addressed:            LTG: To be met in 20 treatments           1. Improve score on assessment tool Quick DASH from 45% impairment to less than 20% impairment  []??  []??  []??      2. Reduce pain levels to 0-1/10 or less with ADLs []? ?  []??  []??        []? ?  []??  []??                        Patient goals: improve mobility       Pt. Education:  [x] Yes  [] No  [x] Reviewed Prior HEP/Ed:Discussed and given new isometrics for HEP    Method of Education: [x] Verbal  [] Demo  [] Written  Comprehension of Education:  [x] Verbalizes understanding. [] Demonstrates understanding. [] Needs review. [x] Demonstrates/verbalizes HEP/Ed previously given. Plan: [x] Continue current frequency toward long and short term goals. [x] Specific Instructions for subsequent treatments: continue progressions per protocol.         Time In: 0900             Time Out: 10:10    Electronically signed by:  Belle Bennett PTA

## 2021-11-03 ENCOUNTER — HOSPITAL ENCOUNTER (OUTPATIENT)
Dept: PHYSICAL THERAPY | Facility: CLINIC | Age: 66
Setting detail: THERAPIES SERIES
Discharge: HOME | End: 2021-11-03
Payer: COMMERCIAL

## 2021-11-03 NOTE — FLOWSHEET NOTE
[x] [facility]  Outpatient Rehabilitation &  Therapy  [facility]   [place]: [phone]  F: [phone]      Physical Therapy Cancel/No Show note    Date: 11/3/2021  Patient: Shelia Berg  : 1955  MRN: [id number]    Cancels/No Shows to date: 2    For today's appointment patient:    [x]  Cancelled    [] Rescheduled appointment    [] No-show     Reason given by patient:    [x]  Patient ill    []  Conflicting appointment    [] No transportation      [] Conflict with work    [] No reason given    [] Weather related    [] COVID-19    [x] Other:      Comments:  Pt said he got the Booster shot a couple days ago so he doesn't know if he's having a reaction to that. He doesn't feel well at all.       [x] Next appointment was confirmed    Electronically signed by: Basim Richardson

## 2021-11-05 ENCOUNTER — HOSPITAL ENCOUNTER (OUTPATIENT)
Dept: PHYSICAL THERAPY | Facility: CLINIC | Age: 66
Setting detail: THERAPIES SERIES
Discharge: HOME | End: 2021-11-05
Payer: COMMERCIAL

## 2021-11-05 PROCEDURE — 97140 MANUAL THERAPY 1/> REGIONS: CPT

## 2021-11-05 PROCEDURE — 97110 THERAPEUTIC EXERCISES: CPT

## 2021-11-05 PROCEDURE — 97016 VASOPNEUMATIC DEVICE THERAPY: CPT

## 2021-11-05 NOTE — FLOWSHEET NOTE
[x] [facility]  Outpatient Rehabilitation &  Therapy  [facility]   [place]: [phone]  F: [phone]      Physical Therapy Daily Treatment Note    Date:  2021  Patient Name:  Shola Peralta    :  1955  MRN: [id number]  Physician: Dr Collette Current: Deniz Kwan # of visits allowed/remaining: Based on Med Nec  Medical Diagnosis: RUE RTC Repair                        Rehab Codes: M62.81 (Muscle Weakness), M62.9 (Disorder of Muscle), M79.1 (Myalgia), Z73.6 (Limitation of ADLs)   Onset Date: 3-2021                                                   Next 's appt. : 21     Visit# / total visits:      Cancels/No Shows:     Subjective:    Pain:  [] Yes  [x] No Location: RUE  Pain Rating: (0-10 scale) 0/10  Pain altered Tx:  [x] No  [] Yes  Action:  Comments: Patient reports no shoulder pain upon entrance. States his R knee is painful, especially with standing while doing exercises.        Exercise     RUE RTC repair  DOS 21  Follow protocol  Reps/ Time  Weight/ Level Comments              Pulleys  5' x flexion               Table slides flex  5\"x20     HEP this date   Table slides Scapt  5\"x20     HEP this date    Wall walks forward  10\"x20 x       Wall walks Abd   10\"x20 x     Supine punches  3x10      Supine ABCs  x2      Wand stretches  10x10\" x  Flex, er only this date                  Isometrics Shoulder:       flex 10\"x10 x     Abd 10\"x10 x     Ext 10\"x10 x     ER/IR 10\"x10 x                              Manual: RUE PROM all planes shoulder    Gameready vasocompression to RUE shoulder, LLE knee medium pressure, 34 degrees x15'      RUE A/AAROM:  Flex 150/158  Abd 165/170  ER 70/85  IR 50/65      Treatment Charges: Mins Units   []  Modalities     [x]  Ther Exercise 30 2   [x]  Manual Therapy 10 1   []  Ther Activities     []  Aquatics     [x]  Vasocompression 15 1   []  Other     Total Treatment time 55 4 Assessment: [x] Progressing toward goals. Continued program with patient reporting muscle fatigue with isometrics this date. He demonstrates full ROM without pain when completing pulley's, but has pain with passive flexion and abduction. Patient is 14 weeks post-op this date. Will continue progression per protocol. [] No change. [] Other:  [x] Patient would continue to benefit from skilled physical therapy services in order to: increase strength and ROM to improve function with ADLs.    Goals  MET NOT MET ON-  GOING  Details   Date Addressed:            STG: To be met in 10 treatments  ?          1. ? Pain: Decrease pain levels to 3/10 with ADLs  []? ?  []??  []??      2. ? ROM: Increase flexibility and AROM limitations throughout to equal bilat to reduce difficulty with ADLs []? ?  []??  []??      3. ? Strength: Increase MMT to 5/5 throughout to ease functional limitations and mobility  []? ?  []??  []??      4. Independent with Home Exercise Programs []? ?  []??  []??        []? ?  []??  []??      Date Addressed:            LTG: To be met in 20 treatments           1. Improve score on assessment tool Quick DASH from 45% impairment to less than 20% impairment  []??  []??  []??      2. Reduce pain levels to 0-1/10 or less with ADLs []? ?  []??  []??        []? ?  []??  []??                        Patient goals: improve mobility       Pt. Education:  [x] Yes  [] No  [x] Reviewed Prior HEP/Ed:Discussed and given new isometrics for HEP    Method of Education: [x] Verbal  [] Demo  [] Written  Comprehension of Education:  [x] Verbalizes understanding. [] Demonstrates understanding. [] Needs review. [x] Demonstrates/verbalizes HEP/Ed previously given. Plan: [x] Continue current frequency toward long and short term goals. [x] Specific Instructions for subsequent treatments: continue progressions per protocol.         Time In: 1000             Time Out: 11:04    Electronically signed by:  Sonia Price Brain Coy

## 2021-11-10 ENCOUNTER — HOSPITAL ENCOUNTER (OUTPATIENT)
Dept: PHYSICAL THERAPY | Facility: CLINIC | Age: 66
Setting detail: THERAPIES SERIES
Discharge: HOME | End: 2021-11-10
Payer: COMMERCIAL

## 2021-11-10 PROCEDURE — 97140 MANUAL THERAPY 1/> REGIONS: CPT

## 2021-11-10 PROCEDURE — 97016 VASOPNEUMATIC DEVICE THERAPY: CPT

## 2021-11-10 PROCEDURE — 97110 THERAPEUTIC EXERCISES: CPT

## 2021-11-10 NOTE — FLOWSHEET NOTE
[x] [facility]  Outpatient Rehabilitation &  Therapy  [facility]   [place]: [phone]  F: [phone]       Physical Therapy Daily Treatment Note    Date:  11/10/2021  Patient Name:  Torsten Delaney    :  1955  MRN: [id number]  Physician: Dr Pollo Martinez: Jaymie Vergara # of visits allowed/remaining: Based on Med Nec  Medical Diagnosis: RUE RTC Repair                        Rehab Codes: M62.81 (Muscle Weakness), M62.9 (Disorder of Muscle), M79.1 (Myalgia), Z73.6 (Limitation of ADLs)   Onset Date: 3-2021                                                   Next 's appt. : 21     Visit# / total visits:      Cancels/No Shows:     Subjective:    Pain:  [] Yes  [x] No Location: RUE  Pain Rating: (0-10 scale) 0/10  Pain altered Tx:  [x] No  [] Yes  Action:  Comments: Patient arrived noting stiffness in R shoulder with no complaint of pain. Exercise     RUE RTC repair  DOS 21  Follow protocol  Reps/ Time  Weight/ Level Comments              Pulleys  5' x flexion               Table slides flex  5\"x20     HEP this date   Table slides Scapt  5\"x20     HEP this date    Wall walks forward  10\"x20 x       Wall walks Abd   10\"x20 x     Supine punches  3x10      Supine ABCs  x2      Wand stretches  10x10\" x  Flex, er only this date                  Isometrics Shoulder:       flex 10\"x10 x     Abd 10\"x10 x     Ext 10\"x10 x     ER/IR 10\"x10 x                   Resistive bands       Rows 2x10 x [place]    IR/ER 2x10 x [place]                             Manual: RUE PROM all planes shoulder    Gameready vasocompression to RUE shoulder, LLE knee medium pressure, 34 degrees x15'      Treatment Charges: Mins Units   []  Modalities     [x]  Ther Exercise 30 2   [x]  Manual Therapy 10 1   []  Ther Activities     []  Aquatics     [x]  Vasocompression 15 1   []  Other     Total Treatment time 55 4       Assessment: [x] Progressing toward goals. Progressed strength program this date. Patient notes fatigue with progressions with no complaint of pain. Will monitor response to treatment and progress HEP as tolerated. [] No change. [] Other:  [x] Patient would continue to benefit from skilled physical therapy services in order to: increase strength and ROM to improve function with ADLs.    Goals  MET NOT MET ON-  GOING  Details   Date Addressed:            STG: To be met in 10 treatments  ?          1. ? Pain: Decrease pain levels to 3/10 with ADLs  []? ?  []??  []??      2. ? ROM: Increase flexibility and AROM limitations throughout to equal bilat to reduce difficulty with ADLs []? ?  []??  []??      3. ? Strength: Increase MMT to 5/5 throughout to ease functional limitations and mobility  []? ?  []??  []??      4. Independent with Home Exercise Programs []? ?  []??  []??        []? ?  []??  []??      Date Addressed:            LTG: To be met in 20 treatments           1. Improve score on assessment tool Quick DASH from 45% impairment to less than 20% impairment  []??  []??  []??      2. Reduce pain levels to 0-1/10 or less with ADLs []? ?  []??  []??        []? ?  []??  []??                        Patient goals: improve mobility       Pt. Education:  [x] Yes  [] No  [x] Reviewed Prior HEP/Ed:Discussed and given new isometrics for HEP    Method of Education: [x] Verbal  [] Demo  [] Written  Comprehension of Education:  [x] Verbalizes understanding. [] Demonstrates understanding. [] Needs review. [x] Demonstrates/verbalizes HEP/Ed previously given. Plan: [x] Continue current frequency toward long and short term goals. [x] Specific Instructions for subsequent treatments: continue progressions per protocol.         Time In: 6828              Time Out: 5628    Electronically signed by:  Selvin Jimenes PTA

## 2021-11-12 ENCOUNTER — HOSPITAL ENCOUNTER (OUTPATIENT)
Dept: PHYSICAL THERAPY | Facility: CLINIC | Age: 66
Setting detail: THERAPIES SERIES
Discharge: HOME | End: 2021-11-12
Payer: COMMERCIAL

## 2021-11-12 PROCEDURE — 97164 PT RE-EVAL EST PLAN CARE: CPT

## 2021-11-12 PROCEDURE — 97110 THERAPEUTIC EXERCISES: CPT

## 2021-11-12 NOTE — PROGRESS NOTES
[x] [facility]  Outpatient Rehabilitation &  Therapy  [facility]   [place]: [phone]  F: [phone]       Physical Therapy Daily Treatment Note    Date:  2021  Patient Name:  Torsten Delaney    :  1955  MRN: [id number]  Physician: Dr Pollo Martinez: Jaymie Vergara # of visits allowed/remaining: Based on Med Nec  Medical Diagnosis: RUE RTC Repair  added dx of L knee meniscus tear 21                 Rehab Codes: M62.81 (Muscle Weakness), M62.9 (Disorder of Muscle), M79.1 (Myalgia), Z73.6 (Limitation of ADLs) S83.242A        Onset Date: 3-2021- shoulder , 10/4/21 - knee                                         Next 's appt. : 21     Visit# / total visits:      Cancels/No Shows:      Subjective:    Pain:  [] Yes  [x] No Location: L knee   Pain Rating: (0-10 scale) 7/10  Pain altered Tx:  [x] No  [] Yes  Action:  Comments: Patient arrived noting stiffness in R shoulder with no complaint of pain. L knee pain: Order added for L knee pain starting 10/4/21 after injuring his knee from slipping on a wet floor. Surgery (medial and partial lateral meniscectony) done on 10/19/21. Patient reports 7/10 pain to his L knee described as a constant dull ache with intermittent stabbing pain on the medial aspect. Pain increases with standing and walking and improves with rest and ice. Has had frequent waking throughout the night secondary to pain. 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        Exercise     RUE RTC repair  DOS 21  Follow protocol  Reps/ Time Completed 21 Weight/ Level Comments    Shoulder           Pulleys  5'  flexion               Table slides flex 20x5\"     HEP this date   Table slides Scapt 20x5\"     HEP this date    Wall walks forward  10\"x10 x       Wall walks Abd   10\"x10      Supine punches  3x10      Supine ABCs  x2      Wand stretches  10x10\"   Flex, secondary to added order following L menisectomy on 10/19/21. Patient demonstrates decreased L knee extension ROM, decreased quadriceps and hip strength on the LLE, and tightness to the LLE muscles. These impairment are affecting his mobility including transfers and gait. Continued shoulder program with patient demonstrating full R shoulder ROM, though has increased pain at end ranges of flexion, abduction, and internal rotation. Patient deferred ice at end of session. Will continue to address patient's R shoulder and L knee pain as tolerated. [] No change. [] Other:  [x] Patient would continue to benefit from skilled physical therapy services in order to: increase strength and ROM to improve function with ADLs.    Goals  MET NOT MET ON-  GOING  Details   Date Addressed:            STG: To be met in 10 treatments  ?          1. ? Pain: Decrease pain levels to 3/10 with ADLs  []? ?  []??  []??      2. ? ROM: Increase flexibility and AROM limitations throughout to equal bilat to reduce difficulty with ADLs []? ?  []??  []??      3. ? Strength: Increase MMT to 5/5 throughout to ease functional limitations and mobility  []? ?  []??  []??      4. Independent with Home Exercise Programs []? ?  []??  []??        []? ?  []??  []??      Date Addressed:            LTG: To be met in 20 treatments           1. Improve score on assessment tool Quick DASH from 45% impairment to less than 20% impairment  []??  []??  []??      2. Reduce pain levels to 0-1/10 or less with ADLs []? ?  []??  []??      3. Goal added 11/12/21: Improve score on assessment tool Lower Extremity Functional Scale (LEFS) from 59% impairment to less than 20% impairment to ease return to work  []? ?  []??  []??                        Patient goals: improve mobility       Pt. Education:  [x] Yes  [] No  [x] Reviewed Prior HEP/Ed  Method of Education: [x] Verbal  [x] Demo  [] Written   Continue shoulder exercises at home to improve strength and mobility.

## 2021-11-17 ENCOUNTER — HOSPITAL ENCOUNTER (OUTPATIENT)
Dept: PHYSICAL THERAPY | Facility: CLINIC | Age: 66
Setting detail: THERAPIES SERIES
Discharge: HOME | End: 2021-11-17
Payer: COMMERCIAL

## 2021-11-17 PROCEDURE — 97016 VASOPNEUMATIC DEVICE THERAPY: CPT

## 2021-11-17 PROCEDURE — 97110 THERAPEUTIC EXERCISES: CPT

## 2021-11-17 NOTE — PROGRESS NOTES
functionally impaired          Treatment Charges: Mins Units   []  Modalities     [x]  Ther Exercise 40 3   []  Manual Therapy     []  Ther Activities     []  Aquatics     [x]  Vasocompression 15 1   []  Re-evaluation      Total Treatment time 60 4       Assessment: [x] Progressing toward goals. Initiated treatment with pulley warm up followed by theraband strengthening exercises with moderate muscular fatigue noted. Pt reports some discomfort in the anterior aspect of the shoulder with rows this date. Implemented towel wall slides and corner pec stretch to continue to reduce tight muscular with a \"good stretch\" reported. Able to progress L knee strengthening exercises with details listed above. Pt has complaints of increased pain in the inferior aspect of the patella and quad weakness. Ended with vasocompression to the R shoulder and L knee to reduce soreness post treatment with positive relief noted post intervention. [] No change. [] Other:  [x] Patient would continue to benefit from skilled physical therapy services in order to: increase strength and ROM to improve function with ADLs.    Goals  MET NOT MET ON-  GOING  Details   Date Addressed:            STG: To be met in 10 treatments  ?          1. ? Pain: Decrease pain levels to 3/10 with ADLs  []? ?  []??  []??      2. ? ROM: Increase flexibility and AROM limitations throughout to equal bilat to reduce difficulty with ADLs []? ?  []??  []??      3. ? Strength: Increase MMT to 5/5 throughout to ease functional limitations and mobility  []? ?  []??  []??      4. Independent with Home Exercise Programs []? ?  []??  []??        []? ?  []??  []??      Date Addressed:            LTG: To be met in 20 treatments           1. Improve score on assessment tool Quick DASH from 45% impairment to less than 20% impairment  []??  []??  []??      2. Reduce pain levels to 0-1/10 or less with ADLs []? ?  []??  []??      3.  Goal added 11/12/21: Improve score on assessment tool Lower Extremity Functional Scale (LEFS) from 59% impairment to less than 20% impairment to ease return to work  []? ?  []??  []??                        Patient goals: improve mobility       Pt. Education:  [x] Yes  [] No  [x] Reviewed Prior HEP/Ed  Method of Education: [x] Verbal  [x] Demo  [] Written   Continue shoulder exercises at home to improve strength and mobility. Focus on LLE stretches and SLR to improve quad strength  Access Code: [email or web address]  URL: [email or web address]  Date: 11/12/2021  Prepared by: Abeba Mercado    Exercises  Seated Hamstring Stretch - 2 x daily - 7 x weekly - 3 sets - 30 hold  Supine Hamstring Stretch with Strap - 2 x daily - 7 x weekly - 3 sets - 30 hold  Long Sitting Calf Stretch with Strap - 2 x daily - 7 x weekly - 3 sets - 30 hold  Modified Josh Stretch - 1 x daily - 7 x weekly - 3 sets - 60 hold  Supine Quadriceps Stretch with Strap on Table - 1 x daily - 7 x weekly - 3 sets - 30 hold      Comprehension of Education:  [x] Verbalizes understanding. [] Demonstrates understanding. [x] Needs review. - of knee exercises   [x] Demonstrates/verbalizes HEP/Ed previously given. Plan: [x] Continue current frequency toward long and short term goals.      [x] Specific Instructions for subsequent treatments: continue progressions per protocol, add L knee rehab including ROM, quad strengthening, and SLS balance       Time In: 10:00 am              Time Out: 11:05 am    Electronically signed by:  Bouchra Wood PTA

## 2021-11-19 ENCOUNTER — HOSPITAL ENCOUNTER (OUTPATIENT)
Dept: PHYSICAL THERAPY | Facility: CLINIC | Age: 66
Setting detail: THERAPIES SERIES
Discharge: HOME | End: 2021-11-19
Payer: COMMERCIAL

## 2021-11-19 PROCEDURE — 97110 THERAPEUTIC EXERCISES: CPT

## 2021-11-19 PROCEDURE — 97016 VASOPNEUMATIC DEVICE THERAPY: CPT

## 2021-11-19 NOTE — PROGRESS NOTES
[x] [facility]  Outpatient Rehabilitation &  Therapy  [facility]   [place]: [phone]  F: [phone]       Physical Therapy Daily Treatment Note    Date:  2021  Patient Name:  Morenita Ashraf    :  1955  MRN: [id number]  Physician: Dr Rosetta Oliva: Dung Farris # of visits allowed/remaining: Based on Med Nec  Medical Diagnosis: RUE RTC Repair  added dx of L knee meniscus tear 21                 Rehab Codes: M62.81 (Muscle Weakness), M62.9 (Disorder of Muscle), M79.1 (Myalgia), Z73.6 (Limitation of ADLs) S83.242A        Onset Date: 3-2021- shoulder DOS 21 , 10/4/21 - knee                                         Next 's appt. : 21     Visit# / total visits: 15/20     Cancels/No Shows:      Subjective:    Pain:  [x] Yes  [] No Location: L knee  Pain Rating: (0-10 scale) 1-2/10 knee  Pain altered Tx:  [x] No  [] Yes  Action:  Comments: Patient arrived noting increased knee soreness due to weather.        Exercise     RUE RTC repair  DOS 21  Follow protocol  Reps/ Time Completed 21 Weight/ Level Comments          Bike 10'             Shoulder           Wall walks Fwd/Abd Lift off  10\"x10       Supine punches  3x10      Supine ABCs  x2      Wand stretches  10x10\"      Doorway pec stretch   5x10\" x            Standing       Flex 2x10  1#    Scap 2x10  1#    Abd 2x10  1#           Resistive bands       Rows  2x10 x Blue    IR/ER  2x10 x Lime     Ext  2x10  Blue           L knee        HS Stretch  3x30\" x     Calf Stretch  3x30\" x            Quad stretch Supine   3x30\" x     SLR   2x10 x     Brdges  2x10             Standing        4 way hip x15      Step ups   2x10 x 4\"    TG squat/HR       Balance board                                Manual: RUE PROM all planes shoulder    Gameready vasocompression to RUE shoulder, LLE knee medium pressure, 34 degrees x15'     Treatment Charges: Mins Units   []  Modalities [x]  Ther Exercise 50 3   []  Manual Therapy     []  Ther Activities     []  Aquatics     [x]  Vasocompression 15 1   []  Re-evaluation      Total Treatment time 65 4       Assessment: [x] Progressing toward goals. Progressed strength program this visit. Patient notes fatigue with LE strength program with no increase in pain or soreness. Issued updated HEP this visit for LE program. Will continue strength progressions per tolerance. [] No change. [] Other:  [x] Patient would continue to benefit from skilled physical therapy services in order to: increase strength and ROM to improve function with ADLs.    Goals  MET NOT MET ON-  GOING  Details   Date Addressed:            STG: To be met in 10 treatments  ?          1. ? Pain: Decrease pain levels to 3/10 with ADLs  []? ?  []??  []??      2. ? ROM: Increase flexibility and AROM limitations throughout to equal bilat to reduce difficulty with ADLs []? ?  []??  []??      3. ? Strength: Increase MMT to 5/5 throughout to ease functional limitations and mobility  []? ?  []??  []??      4. Independent with Home Exercise Programs []? ?  []??  []??        []? ?  []??  []??      Date Addressed:            LTG: To be met in 20 treatments           1. Improve score on assessment tool Quick DASH from 45% impairment to less than 20% impairment  []??  []??  []??      2. Reduce pain levels to 0-1/10 or less with ADLs []? ?  []??  []??      3. Goal added 11/12/21: Improve score on assessment tool Lower Extremity Functional Scale (LEFS) from 59% impairment to less than 20% impairment to ease return to work  []? ?  []??  []??                        Patient goals: improve mobility       Pt. Education:  [x] Yes  [] No  [x] Reviewed Prior HEP/Ed  Method of Education: [x] Verbal  [] Demo  [x] Written:  Access Code: [email or web address]  URL: [email or web address]  Date: 11/19/2021  Prepared by: José Lancaster    Exercises  Standing Hip Abduction with Anchored Resistance - 1 x daily - 7 x weekly - 10 reps - 3 sets  Standing Hip Extension with Anchored Resistance - 1 x daily - 7 x weekly - 10 reps - 3 sets  Standing Hip Adduction with Anchored Resistance - 1 x daily - 7 x weekly - 10 reps - 3 sets  Standing Repeated Hip Flexion with Resistance - 1 x daily - 7 x weekly - 10 reps - 3 sets  Standing Terminal Knee Extension with Resistance - 1 x daily - 7 x weekly - 10 reps - 3 sets - 30 (sec) hold  Supine Active Straight Leg Raise - 1 x daily - 7 x weekly - 3 sets - 10 reps     Comprehension of Education:  [x] Verbalizes understanding. [] Demonstrates understanding. [x] Needs review. - of knee exercises   [x] Demonstrates/verbalizes HEP/Ed previously given. Plan: [x] Continue current frequency toward long and short term goals. [x] Specific Instructions for subsequent treatments: Progress over strength program per tolerance and protocol.         Time In: 0950               Time Out: 1100    Electronically signed by:  Ruddy Manriquez PTA

## 2021-11-23 ENCOUNTER — HOSPITAL ENCOUNTER (OUTPATIENT)
Dept: PHYSICAL THERAPY | Facility: CLINIC | Age: 66
Setting detail: THERAPIES SERIES
Discharge: HOME | End: 2021-11-23
Payer: COMMERCIAL

## 2021-11-23 PROCEDURE — 97110 THERAPEUTIC EXERCISES: CPT

## 2021-11-23 PROCEDURE — 97016 VASOPNEUMATIC DEVICE THERAPY: CPT

## 2021-11-23 PROCEDURE — 97140 MANUAL THERAPY 1/> REGIONS: CPT

## 2021-11-23 NOTE — PROGRESS NOTES
[x] [facility]  Outpatient Rehabilitation &  Therapy  [facility]   [place]: [phone]  F: [phone]       Physical Therapy Daily Treatment Note    Date:  2021  Patient Name:  Kalyani San    :    MRN: [id number]  Physician: Dr Loretta Fine: Celesta Dash # of visits allowed/remaining: Based on Med Nec  Medical Diagnosis: RUE RTC Repair  added dx of L knee meniscus tear 21                 Rehab Codes: M62.81 (Muscle Weakness), M62.9 (Disorder of Muscle), M79.1 (Myalgia), Z73.6 (Limitation of ADLs) S83.242A        Onset Date: 3-2021- shoulder DOS 21 , 10/4/21 - knee                                           Next 's appt. : 21     Visit# / total visits:      Cancels/No Shows:  0/0    Subjective:    Pain:  [x] Yes  [] No Location: LLE knee  Pain Rating: (0-10 scale) 3/10 knee, 5/10 RUE shoulder   Pain altered Tx:  [x] No  [] Yes  Action:  Comments: Patient arrived today with less overall knee pain, but continues to feel stiffness in the LLE knee. Reports also RUE shoulder/UT pain after last session.          Exercise     RUE RTC repair  DOS 21  Follow protocol  Reps/ Time Weight/ Level Comments         Bike 10'           Shoulder          Wall walks Fwd/Abd Lift off  10\"x10      Supine punches  3x10     Supine ABCs  x2     Wand stretches  10x10\"     Doorway pec stretch   5x10\"     UT Stretch   3x30\"           Standing      Flex  2x10 1#    Scap  2x10 1#    Abd  2x10 1#          Resistive bands      Rows  2x10 Blue    IR/ER  2x10 Lime     Ext  2x10 Blue          LLE knee       HS Stretch  3x30\"     Calf SB Stretch  3x30\"           Quad stretch Supine   3x30\"     SLR   2x10     Bridges  2x10           Standing       4 way hip Resistance Band x15 Green     Step ups  2x10 4\"    Balance board L2 x5'     Total Gym Squats L20 x20     Total Gym HR  L20x20                     Somatic Dysfunctions Normal Deficit Details   Thoracic   [] [] FRSR T2-MET   Rib   [] [] R 1st/2nd rib post-MET  R UT MFR   FRS=flexed, rotated, side-bent  ERS=extended, rotated, side-bent   MOB=Mobilization  MFR=Myofascial Release  MET=Muscle Energy Technique  MFR=Myofascial Release        Gameready vasocompression to RUE shoulder, LLE knee medium pressure, 34 degrees x15'     Treatment Charges: Mins Units   []  Modalities     [x]  Ther Exercise 45 3   []  Manual Therapy 10 1   []  Ther Activities     []  Aquatics     [x]  Vasocompression 15 1   []  Re-evaluation      Total Treatment time 70 5       Assessment: [x] Progressing toward goals. Progressed strengthening and stretching program for patient as tolerated. Needed frequent rest breaks but able to tolerate all exercises well. Manual to correct somatic dysfunctions in the Tspine and ribs that has amrit giving patient pain, immediate relief of symptoms post treatment. Encouraged patient to heat, stretch at home to keep symptoms from returning. Discussed HEP and posture for patient as well    [] No change. [] Other:  [x] Patient would continue to benefit from skilled physical therapy services in order to: increase strength and ROM to improve function with ADLs.    Goals  MET NOT MET ON-  GOING  Details   Date Addressed:            STG: To be met in 10 treatments  ?          1. ? Pain: Decrease pain levels to 3/10 with ADLs  []? ?  []??  []??      2. ? ROM: Increase flexibility and AROM limitations throughout to equal bilat to reduce difficulty with ADLs []? ?  []??  []??      3. ? Strength: Increase MMT to 5/5 throughout to ease functional limitations and mobility  []? ?  []??  []??      4. Independent with Home Exercise Programs []? ?  []??  []??        []? ?  []??  []??      Date Addressed:            LTG: To be met in 20 treatments           1. Improve score on assessment tool Quick DASH from 45% impairment to less than 20% impairment  []??  []??  []??      2.  Reduce pain levels to 0-1/10 or less with ADLs []? ?  []??  []??      3. Goal added 11/12/21: Improve score on assessment tool Lower Extremity Functional Scale (LEFS) from 59% impairment to less than 20% impairment to ease return to work  []? ?  []??  []??                        Patient goals: improve mobility       Pt. Education:  [x] Yes  [] No  [x] Reviewed Prior HEP/Ed, discussed posture and HEP adherence   Method of Education: [x] Verbal  [] Demo  [x] Written:  Comprehension of Education:  [x] Verbalizes understanding. [] Demonstrates understanding. [x] Needs review. - of knee exercises   [x] Demonstrates/verbalizes HEP/Ed previously given. Plan: [x] Continue current frequency toward long and short term goals. [x] Specific Instructions for subsequent treatments: Progress over strength program per tolerance and protocol.         Time In: 0900              Time Out: 1010    Electronically signed by:  J Luis Davies PT

## 2021-12-01 ENCOUNTER — HOSPITAL ENCOUNTER (OUTPATIENT)
Dept: PHYSICAL THERAPY | Facility: CLINIC | Age: 66
Setting detail: THERAPIES SERIES
Discharge: HOME | End: 2021-12-01
Payer: COMMERCIAL

## 2021-12-01 PROCEDURE — 97110 THERAPEUTIC EXERCISES: CPT

## 2021-12-01 PROCEDURE — 97016 VASOPNEUMATIC DEVICE THERAPY: CPT

## 2021-12-01 NOTE — FLOWSHEET NOTE
[x] [facility]  Outpatient Rehabilitation &  Therapy  [facility]   [place]: [phone]  F: [phone]       Physical Therapy Daily Treatment Note    Date:  2021  Patient Name:  Lyubov Leyva    :    MRN: [id number]  Physician: Dr Karla Duggan: Jeffrey Cam # of visits allowed/remaining: Based on Med Nec  Medical Diagnosis: RUE RTC Repair  added dx of L knee meniscus tear 21                 Rehab Codes: M62.81 (Muscle Weakness), M62.9 (Disorder of Muscle), M79.1 (Myalgia), Z73.6 (Limitation of ADLs) S83.242A        Onset Date: 3-2021- shoulder DOS 21 , 10/4/21 - knee                                           Next 's appt. : 21     Visit# / total visits:      Cancels/No Shows:  0/0    Subjective:    Pain:  [x] Yes  [] No Location: LLE knee  Pain Rating: (0-10 scale) 2/10 knee, 2/10 RUE shoulder   Pain altered Tx:  [x] No  [] Yes  Action:   Comments: Patient arrived noting minor pain, just stiffness.        Exercise     RUE RTC repair  DOS 21  Follow protocol  Reps/ Time Weight/ Level Comments         Bike 10'           Shoulder          Wall walks Fwd/Abd Lift off  10\"x10      Supine punches  3x10     Supine ABCs  x2     Wand stretches  10x10\"     Doorway pec stretch   5x10\"     UT Stretch   3x30\"           Standing      Flex  2x10 1#    Scap  2x10 1#    Abd  2x10 1#          Resistive bands      Rows  2x10 Blue    IR/ER  2x10 Lime     Ext  2x10 Blue          LLE knee       HS Stretch  3x30\"     Calf SB Stretch  3x30\"           Quad stretch Supine   3x30\"     SLR   2x10     Bridges  2x10           Standing       4 way hip Resistance Band x20 Green     Step ups  2x10 6\"    Balance board L2 x5'     Total Gym Squats L20 x20     Total Gym HR  L20x20     Lunges 2x10             Gameready vasocompression to RUE shoulder, LLE knee medium pressure, 34 degrees x15'     Treatment Charges: Mins Units   [] Modalities     [x]  Ther Exercise 45 3   []  Manual Therapy     []  Ther Activities     []  Aquatics     [x]  Vasocompression 15 1   []  Re-evaluation      Total Treatment time 60 4       Assessment: [x] Progressing toward goals. Continued strength and stability progressions this date. Patient notes overall fatigue and muscle soreness with progressions with no increase in pain. Improved form and control noted this date. Cues needed for technique with UE exercises. [] No change. [] Other:  [x] Patient would continue to benefit from skilled physical therapy services in order to: increase strength and ROM to improve function with ADLs.    Goals  MET NOT MET ON-  GOING  Details   Date Addressed:            STG: To be met in 10 treatments  ?          1. ? Pain: Decrease pain levels to 3/10 with ADLs  []? ?  []??  []??      2. ? ROM: Increase flexibility and AROM limitations throughout to equal bilat to reduce difficulty with ADLs []? ?  []??  []??      3. ? Strength: Increase MMT to 5/5 throughout to ease functional limitations and mobility  []? ?  []??  []??      4. Independent with Home Exercise Programs []? ?  []??  []??        []? ?  []??  []??      Date Addressed:            LTG: To be met in 20 treatments           1. Improve score on assessment tool Quick DASH from 45% impairment to less than 20% impairment  []??  []??  []??      2. Reduce pain levels to 0-1/10 or less with ADLs []? ?  []??  []??      3. Goal added 11/12/21: Improve score on assessment tool Lower Extremity Functional Scale (LEFS) from 59% impairment to less than 20% impairment to ease return to work  []? ?  []??  []??                        Patient goals: improve mobility       Pt. Education:  [x] Yes  [] No  [x] Reviewed Prior HEP/Ed, discussed posture and HEP adherence   Method of Education: [x] Verbal  [] Demo  [x] Written:  Comprehension of Education:  [x] Verbalizes understanding. [] Demonstrates understanding. [x] Needs review.  - of knee exercises   [x] Demonstrates/verbalizes HEP/Ed previously given. Plan: [x] Continue current frequency toward long and short term goals. [x] Specific Instructions for subsequent treatments: Progress over strength program per tolerance and protocol.         Time In: 1100              Time Out: 1205    Electronically signed by:  Valentine Herrera PTA

## 2021-12-03 ENCOUNTER — HOSPITAL ENCOUNTER (OUTPATIENT)
Dept: PHYSICAL THERAPY | Facility: CLINIC | Age: 66
Setting detail: THERAPIES SERIES
Discharge: HOME | End: 2021-12-03
Payer: COMMERCIAL

## 2021-12-03 PROCEDURE — 97110 THERAPEUTIC EXERCISES: CPT

## 2021-12-03 PROCEDURE — 97016 VASOPNEUMATIC DEVICE THERAPY: CPT

## 2021-12-03 NOTE — FLOWSHEET NOTE
[x] [facility]  Outpatient Rehabilitation &  Therapy  [facility]   [place]: [phone]  F: [phone]       Physical Therapy Daily Treatment Note    Date:  12/3/2021  Patient Name:  Ahsan Bell    :    MRN: [id number]  Physician: Dr Olvin Holman: Faith Cobos # of visits allowed/remaining: Based on Med Nec  Medical Diagnosis: RUE RTC Repair  added dx of L knee meniscus tear 21                 Rehab Codes: M62.81 (Muscle Weakness), M62.9 (Disorder of Muscle), M79.1 (Myalgia), Z73.6 (Limitation of ADLs) S83.242A        Onset Date: 3-2021- shoulder DOS 21 , 10/4/21 - knee                                           Next 's appt. : 21     Visit# / total visits:      Cancels/No Shows:  0/0    Subjective:    Pain:  [x] Yes  [] No Location: LLE knee  Pain Rating: (0-10 scale) 2/10 L knee, 0/10 RUE shoulder   Pain altered Tx:  [x] No  [] Yes  Action:   Comments: Patient arrives noting he experienced intense pain at medial aspect L knee yesterday with ambulation. States he thinks the increased pain was d/t being too aggressive with resisted 4 way hip last session. Notes he applied his knee brace, used cane, elevated leg, and iced knee to reduce sx's. Presents to therapy with reduced knee pain and denies shoulder pain this date.        Exercise     RUE RTC repair  DOS 21  Follow protocol  Reps/ Time Weight/ Level Comments         Bike 10'           Shoulder          Wall walks Fwd/Abd Lift off  10\"x10      Supine punches  3x10     Supine ABCs  x2     Wand stretches  10x10\"     Doorway pec stretch   5x10\"     UT Stretch   3x30\"           Standing      Flex  2x10 1#    Scap  2x10 1#    Abd  2x10 1#          Resistive bands      Rows  2x10 Blue    IR/ER  2x10 Blue     Ext  2x10 Blue          LLE knee       HS Stretch  3x30\"     Calf SB Stretch  3x30\"           Quad stretch Supine   3x30\"     SLR   2x10     Prakash Tao 2x10     LAQ  2x10           Standing       4 way hip Resistance Band x20 Green  Held    Step ups  2x10 6\"    Balance board L2 x5'     Total Gym Squats L20 x20     Total Gym HR  L20x20     Lunges 2x10             Gameready vasocompression to RUE shoulder, LLE knee high pressure, 34 degrees x15'     Treatment Charges: Mins Units   []  Modalities     [x]  Ther Exercise 42 3   []  Manual Therapy     []  Ther Activities     []  Aquatics     [x]  Vasocompression 15 1   []  Re-evaluation      Total Treatment time 57 4       Assessment: [x] Progressing toward goals. Continued with exercise program to improve strength and ROM. Notes irritation at inferior aspect of L knee throughout session. Increased resistance to blue for shoulder IR/ER. Added LAQ's with good tolerance. Incorporated lunges in // bars requiring demonstrations for proper technique. Ended with vaso to R shoulder and L knee in supine for pain management. [] No change. [] Other:  [x] Patient would continue to benefit from skilled physical therapy services in order to: increase strength and ROM to improve function with ADLs.    Goals  MET NOT MET ON-  GOING  Details   Date Addressed:            STG: To be met in 10 treatments  ?          1. ? Pain: Decrease pain levels to 3/10 with ADLs  []? ?  []??  []??      2. ? ROM: Increase flexibility and AROM limitations throughout to equal bilat to reduce difficulty with ADLs []? ?  []??  []??      3. ? Strength: Increase MMT to 5/5 throughout to ease functional limitations and mobility  []? ?  []??  []??      4. Independent with Home Exercise Programs []? ?  []??  []??        []? ?  []??  []??      Date Addressed:            LTG: To be met in 20 treatments           1. Improve score on assessment tool Quick DASH from 45% impairment to less than 20% impairment  []??  []??  []??      2. Reduce pain levels to 0-1/10 or less with ADLs []? ?  []??  []??      3.  Goal added 11/12/21: Improve score on assessment tool Lower Extremity Functional Scale (LEFS) from 59% impairment to less than 20% impairment to ease return to work  []? ?  []??  []??                        Patient goals: improve mobility       Pt. Education:  [x] Yes  [] No  [x] Reviewed Prior HEP/Ed, discussed posture and HEP adherence   Method of Education: [x] Verbal  [x] Demo  [] Written:  Comprehension of Education:   [x] Verbalizes understanding. [] Demonstrates understanding. [x] Needs review. - of knee exercises   [x] Demonstrates/verbalizes HEP/Ed previously given. Plan: [x] Continue current frequency toward long and short term goals. [x] Specific Instructions for subsequent treatments: Progress over strength program per tolerance and protocol.         Time In: 10:58               Time Out: 1205    Electronically signed by:  Jordan Olsen PTA

## 2021-12-08 ENCOUNTER — APPOINTMENT (OUTPATIENT)
Dept: PHYSICAL THERAPY | Facility: CLINIC | Age: 66
End: 2021-12-08
Payer: COMMERCIAL

## 2021-12-10 ENCOUNTER — HOSPITAL ENCOUNTER (OUTPATIENT)
Dept: PHYSICAL THERAPY | Facility: CLINIC | Age: 66
Setting detail: THERAPIES SERIES
Discharge: HOME | End: 2021-12-10
Payer: COMMERCIAL

## 2021-12-10 PROCEDURE — 97110 THERAPEUTIC EXERCISES: CPT

## 2021-12-10 PROCEDURE — 97016 VASOPNEUMATIC DEVICE THERAPY: CPT

## 2021-12-10 NOTE — FLOWSHEET NOTE
[x] [facility]  Outpatient Rehabilitation &  Therapy  [facility]   [place]: [phone]  F: [phone]       Physical Therapy Daily Treatment Note    Date:  12/10/2021  Patient Name:  Nya Mcdaniel    :    MRN: [id number]  Physician: Dr Angelica Barajas: Adrián Garay # of visits allowed/remaining: Based on Med Nec  Medical Diagnosis: RUE RTC Repair  added dx of L knee meniscus tear 21                 Rehab Codes: M62.81 (Muscle Weakness), M62.9 (Disorder of Muscle), M79.1 (Myalgia), Z73.6 (Limitation of ADLs) S83.242A        Onset Date: 3-2021- shoulder DOS 21 , 10/4/21 - knee                                           Next 's appt. : 21     Visit# / total visits:      Cancels/No Shows:  0/0    Subjective:    Pain:  [x] Yes  [] No Location: LLE knee  Pain Rating: (0-10 scale) 2/10 L knee, 0/10 RUE shoulder   Pain altered Tx:  [x] No  [] Yes  Action:   Comments: Patient arrives to therapy reporting no L knee or R shoulder pain. States he received a cortisone shot to L knee on Wednesday and reports reduced pain today.        Exercise     RUE RTC repair  DOS 21  Follow protocol  Reps/ Time Weight/ Level Comments         Bike 10'           Shoulder          Wall walks Fwd/Abd Lift off  10\"x10   NT   Supine punches  3x10  NT   Supine ABCs  x2  NT   Wand stretches  10x10\"  NT   Doorway pec stretch   5x10\"     UT Stretch   3x30\"           Standing      Flex  2x10 1#    Scap  2x10 1#    Abd  2x10 1#          Resistive bands      Rows  2x10 Blue    IR/ER  2x10 Blue     Ext  2x10 Blue          LLE knee       HS Stretch  3x30\"     Calf SB Stretch  3x30\"           Quad stretch Supine   3x30\"     SLR   2x10     Bridges  2x10     LAQ  2x10           Standing       4 way hip Resistance Band x20 Green  Regressed to orange 12/10   Step ups  2x10 6\"    Balance board L2 x5'     Total Gym Squats L20 x20     Total Gym HR  L20x20 Lunges 2x10  HELD due to increasing pain           Gameready vasocompression to RUE shoulder, LLE knee high pressure, 34 degrees x15'     Treatment Charges: Mins Units   []  Modalities     [x]  Ther Exercise 48 3   []  Manual Therapy     []  Ther Activities     []  Aquatics     [x]  Vasocompression 15 1   []  Re-evaluation      Total Treatment time 63 4       Assessment: [x] Progressing toward goals. Continued with exercise program with overall good tolerance of all exercises. Continued with 4 way hip this date with orange theraband as pt reported pain with lime in previous sessions with good tolerance this date. Held lunges due to increasing pain with each rep this date. Ended session with vasocompression to L knee with good relief noted at end of treatment. [] No change. [] Other:  [x] Patient would continue to benefit from skilled physical therapy services in order to: increase strength and ROM to improve function with ADLs.    Goals  MET NOT MET ON-  GOING  Details   Date Addressed:            STG: To be met in 10 treatments  ?          1. ? Pain: Decrease pain levels to 3/10 with ADLs  []? ?  []??  []??      2. ? ROM: Increase flexibility and AROM limitations throughout to equal bilat to reduce difficulty with ADLs []? ?  []??  []??      3. ? Strength: Increase MMT to 5/5 throughout to ease functional limitations and mobility  []? ?  []??  []??      4. Independent with Home Exercise Programs []? ?  []??  []??        []? ?  []??  []??      Date Addressed:            LTG: To be met in 20 treatments           1. Improve score on assessment tool Quick DASH from 45% impairment to less than 20% impairment  []??  []??  []??      2. Reduce pain levels to 0-1/10 or less with ADLs []? ?  []??  []??      3. Goal added 11/12/21: Improve score on assessment tool Lower Extremity Functional Scale (LEFS) from 59% impairment to less than 20% impairment to ease return to work  []? ?  []??  []??                      Patient goals: improve mobility       Pt. Education:  [x] Yes  [] No  [x] Reviewed Prior HEP/Ed, discussed posture and HEP adherence   Method of Education: [x] Verbal  [x] Demo  [] Written:  Comprehension of Education:   [x] Verbalizes understanding. [] Demonstrates understanding. [x] Needs review. - of knee exercises   [x] Demonstrates/verbalizes HEP/Ed previously given. Plan: [x] Continue current frequency toward long and short term goals. [x] Specific Instructions for subsequent treatments: Progress over strength program per tolerance and protocol.         Time In: 9:50am               Time Out: 11:03am    Electronically signed by:  Maria Victoria Casey PTA

## 2021-12-14 ENCOUNTER — TELEPHONE (OUTPATIENT)
Dept: PHARMACY | Facility: CLINIC | Age: 66
End: 2021-12-14

## 2021-12-14 NOTE — TELEPHONE ENCOUNTER
For [place] in place:  No   Recommendation Provided To: Patient/Caregiver: 1 via Telephone   Gap Closed?: Yes    Intervention Accepted By: Patient/Caregiver: 1   Time Spent (min): 15

## 2021-12-14 NOTE — TELEPHONE ENCOUNTER
According to our records, patient is missing the following requirements that must be completed by December 31st, 2021:   Program Requirements that need to be completed by December 31st, 2021 to remain eligible for program:       Diabetes Visit with your physician in 2021 (Second yearly visit)        Spoke to patient and advised them of the above information. Patient verified understanding.           Nerissa Martino, [address] [facility]   Phone: [phone]

## 2021-12-15 ENCOUNTER — HOSPITAL ENCOUNTER (OUTPATIENT)
Dept: PHYSICAL THERAPY | Facility: CLINIC | Age: 66
Setting detail: THERAPIES SERIES
Discharge: HOME | End: 2021-12-15
Payer: COMMERCIAL

## 2021-12-15 NOTE — FLOWSHEET NOTE
[x] [facility]  Outpatient Rehabilitation &  Therapy  [facility]   [place]: [phone]  F: [phone]      Physical Therapy Cancel/No Show note    Date: 12/15/2021  Patient: Emmaline Bamberger  : 1955  MRN: [id number]    Cancels/No Shows to date: 3    For today's appointment patient:    [x]  Cancelled    [] Rescheduled appointment    [] No-show     Reason given by patient:    [x]  Patient ill    []  Conflicting appointment    [] No transportation      [] Conflict with work    [] No reason given    [] Weather related    [] COVID-19    [x] Other:      Comments:  Pt said his wife tested positive for Covid so he's going to call his doctor & get tested. Pt said he thinks it's best he stay home & he will call us about 21 appt.     [x] Next appointment was confirmed    Electronically signed by: Rach Baron

## 2021-12-17 ENCOUNTER — CLINICAL DOCUMENTATION (OUTPATIENT)
Dept: PHARMACY | Facility: CLINIC | Age: 66
End: 2021-12-17

## 2021-12-17 ENCOUNTER — HOSPITAL ENCOUNTER (OUTPATIENT)
Dept: PHYSICAL THERAPY | Facility: CLINIC | Age: 66
Setting detail: THERAPIES SERIES
Discharge: HOME | End: 2021-12-17
Payer: COMMERCIAL

## 2021-12-17 PROCEDURE — 97110 THERAPEUTIC EXERCISES: CPT

## 2021-12-17 NOTE — FLOWSHEET NOTE
[x] [facility]  Outpatient Rehabilitation &  Therapy  [facility]   [place]: [phone]  F: [phone]       Physical Therapy Daily Treatment Note    Date:  2021  Patient Name:  Lyubov Leyva    :    MRN: [id number]  Physician: Dr Karla Duggan: Jeffrey Cam # of visits allowed/remaining: Based on Med Nec  Medical Diagnosis: RUE RTC Repair  added dx of L knee meniscus tear 21                 Rehab Codes: M62.81 (Muscle Weakness), M62.9 (Disorder of Muscle), M79.1 (Myalgia), Z73.6 (Limitation of ADLs) S83.242A        Onset Date: 3-2021- shoulder DOS 21 , 10/4/21 - knee                                           Next 's appt. : 21     Visit# / total visits:      Cancels/No Shows:  0/0    Subjective:    Pain:  [] Yes  [x] No Location:  Pain Rating: (0-10 scale) 0/10 L knee, 0/10 RUE shoulder   Pain altered Tx:  [x] No  [] Yes  Action:   Comments: Patient arrives stating no pain in his knee or shoulder and would like to be discharged today and complete his HEP independently since he has not had pain and he starts work in January. Exercise     RUE RTC repair  DOS 21  Follow protocol  Reps/ Time Weight/ Level Comments         Bike 10'           Shoulder          Doorway pec stretch   5x10\"     UT Stretch   3x30\"           Resistive bands      Rows  2x10 Blue    IR/ER  2x10 Blue     Ext  2x10 Blue          LLE knee       HS Stretch  3x30\"     Calf SB Stretch  3x30\"           Standing       4 way hip Resistance Band x20 Green     Lunges x10               Treatment Charges: Mins Units   []  Modalities     [x]  Ther Exercise 15 1   []  Manual Therapy     []  Ther Activities     []  Aquatics     []  Vasocompression     []  Re-evaluation      Total Treatment time 15 1       Assessment: [x] Progressing toward goals. Reviewed HEP to ensure understanding and good form with each exercise.  Administered thera-band to progress exercises at home. Educated pt to perform exercises to tolerance and pain free. Pt will be discharged today and complete exercises at home independently. [] No change. [] Other:  [x] Patient would continue to benefit from skilled physical therapy services in order to: increase strength and ROM to improve function with ADLs.    Goals  MET NOT MET ON-  GOING  Details   Date Addressed:            STG: To be met in 10 treatments  ?          1. ? Pain: Decrease pain levels to 3/10 with ADLs  []? ?  []??  []??      2. ? ROM: Increase flexibility and AROM limitations throughout to equal bilat to reduce difficulty with ADLs []? ?  []??  []??      3. ? Strength: Increase MMT to 5/5 throughout to ease functional limitations and mobility  []? ?  []??  []??      4. Independent with Home Exercise Programs []? ?  []??  []??        []? ?  []??  []??      Date Addressed:            LTG: To be met in 20 treatments           1. Improve score on assessment tool Quick DASH from 45% impairment to less than 20% impairment  []??  []??  []??      2. Reduce pain levels to 0-1/10 or less with ADLs []? ?  []??  []??      3. Goal added 11/12/21: Improve score on assessment tool Lower Extremity Functional Scale (LEFS) from 59% impairment to less than 20% impairment to ease return to work  []? ?  []??  []??                        Patient goals: improve mobility       Pt. Education:  [x] Yes  [] No  [x] Reviewed Prior HEP/Ed, reviewed HEP to complete at home. Method of Education: [x] Verbal  [x] Demo  [] Written:  Comprehension of Education:   [x] Verbalizes understanding. [] Demonstrates understanding. [x] Needs review. - of knee exercises   [x] Demonstrates/verbalizes HEP/Ed previously given. Plan: [x] Continue current frequency toward long and short term goals. [x] Specific Instructions for subsequent treatments: Progress over strength program per tolerance and protocol.         Time In: 9:50am Time Out: 10:15am    Electronically signed by:  Leonel Santana PTA

## 2021-12-20 ENCOUNTER — APPOINTMENT (OUTPATIENT)
Dept: PHYSICAL THERAPY | Facility: CLINIC | Age: 66
End: 2021-12-20
Payer: COMMERCIAL

## 2021-12-22 ENCOUNTER — APPOINTMENT (OUTPATIENT)
Dept: PHYSICAL THERAPY | Facility: CLINIC | Age: 66
End: 2021-12-22
Payer: COMMERCIAL

## 2021-12-30 ENCOUNTER — HOSPITAL ENCOUNTER (OUTPATIENT)
Age: 66
Discharge: HOME | End: 2021-12-30
Payer: COMMERCIAL

## 2021-12-30 LAB
ALBUMIN SERPL-MCNC: 4.3 G/DL (ref 3.5–5.2)
ALBUMIN/GLOBULIN RATIO: 1.7 (ref 1–2.5)
ALP BLD-CCNC: 102 U/L (ref 40–129)
ALT SERPL-CCNC: 41 U/L (ref 5–41)
ANION GAP SERPL CALCULATED.3IONS-SCNC: 13 MMOL/L (ref 9–17)
AST SERPL-CCNC: 27 U/L
BILIRUB SERPL-MCNC: 0.28 MG/DL (ref 0.3–1.2)
BUN BLDV-MCNC: 20 MG/DL (ref 8–23)
BUN/CREAT BLD: ABNORMAL (ref 9–20)
CALCIUM SERPL-MCNC: 9.4 MG/DL (ref 8.6–10.4)
CHLORIDE BLD-SCNC: 105 MMOL/L (ref 98–107)
CHOLESTEROL/HDL RATIO: 6.6
CHOLESTEROL: 292 MG/DL
CO2: 23 MMOL/L (ref 20–31)
CREAT SERPL-MCNC: 1.04 MG/DL (ref 0.7–1.2)
CREATININE URINE: 107.6 MG/DL (ref 39–259)
ESTIMATED AVERAGE GLUCOSE: 151 MG/DL
GFR AFRICAN AMERICAN: >60 ML/MIN
GFR NON-AFRICAN AMERICAN: >60 ML/MIN
GFR SERPL CREATININE-BSD FRML MDRD: ABNORMAL ML/MIN/{1.73_M2}
GFR SERPL CREATININE-BSD FRML MDRD: ABNORMAL ML/MIN/{1.73_M2}
GLUCOSE BLD-MCNC: 162 MG/DL (ref 70–99)
HBA1C MFR BLD: 6.9 % (ref 4–6)
HDLC SERPL-MCNC: 44 MG/DL
LDL CHOLESTEROL: 180 MG/DL (ref 0–130)
MICROALBUMIN/CREAT 24H UR: <12 MG/L
MICROALBUMIN/CREAT UR-RTO: NORMAL MCG/MG CREAT
POTASSIUM SERPL-SCNC: 4.5 MMOL/L (ref 3.7–5.3)
SODIUM BLD-SCNC: 141 MMOL/L (ref 135–144)
TOTAL PROTEIN: 6.8 G/DL (ref 6.4–8.3)
TRIGL SERPL-MCNC: 340 MG/DL
VLDLC SERPL CALC-MCNC: ABNORMAL MG/DL (ref 1–30)

## 2021-12-30 PROCEDURE — 36415 COLL VENOUS BLD VENIPUNCTURE: CPT

## 2021-12-30 PROCEDURE — 82043 UR ALBUMIN QUANTITATIVE: CPT

## 2021-12-30 PROCEDURE — 80061 LIPID PANEL: CPT

## 2021-12-30 PROCEDURE — 82570 ASSAY OF URINE CREATININE: CPT

## 2021-12-30 PROCEDURE — 80053 COMPREHEN METABOLIC PANEL: CPT

## 2021-12-30 PROCEDURE — 83036 HEMOGLOBIN GLYCOSYLATED A1C: CPT

## 2022-02-11 ENCOUNTER — TELEPHONE (OUTPATIENT)
Dept: PHARMACY | Facility: CLINIC | Age: 67
End: 2022-02-11

## 2022-02-11 NOTE — TELEPHONE ENCOUNTER
[facility],[address] [facility] Employee Diabetes Program - Be Well With Diabetes  =================================================================  Charity Mason is a 61 y.o. male enrolled in the [address] [place] [address] with patient for yearly visit to discuss enrollment in program. Patient provided writer with verbal consent to remain in the program for this year. Program Requirements to be completed in 2022:  1. Two office visits in 2022 for diabetes (1st must be completed by 7/1/2022)  2. Two A1c levels in 2022 (1st must be completed by 7/1/2022)   3. Yearly lipid panel  4. Yearly urine microalbumin test  5. Diabetes education if A1c is over 8%  6. Taking an ACE/ARB medication if appropriate  7. Taking a statin medication if appropriate  8. Pneumonia vaccine up to date  5. Yearly flu shot (for 0950-6253 season)  10. Medication adherence over 70%. Patients who fall below 70% will be contacted by a pharmacist in the program to discuss any issues. Are you currently using [address] [place] [address] (home delivery pharmacy) to get your prescriptions? Yes    Would you like to speak with a pharmacist about the program, your diabetes, and/or your prescriptions? No    [facility] Team  Phone: toll free [phone], option #3  Fax [phone]  Email: [email or web address]    For Pharmacy Admin Tracking Only    PHSO: Yes  Recommended intervention potential cost savings: 1  Time Spent (min): 15

## 2022-03-02 ENCOUNTER — TELEPHONE (OUTPATIENT)
Dept: PHARMACY | Facility: CLINIC | Age: 67
End: 2022-03-02

## 2022-03-02 NOTE — TELEPHONE ENCOUNTER
[facility] CLINICAL PHARMACY REVIEW - [facility] WITH DIABETES: Statin, ACE/ARB Gaps  =================================================================  Charity Mason is a 61 y.o. male enrolled in [facility] with Diabetes    Identified care gap: Patient with diabetes not currently filling ACE/ARB therapy    Current medications  Current Outpatient Medications   Medication Sig Dispense Refill    ondansetron (ZOFRAN) 4 MG tablet Take 1 tablet by mouth every 6 hours as needed for Nausea 30 tablet 1    docusate sodium (COLACE) 100 MG capsule Take 1 capsule by mouth 2 times daily 30 capsule 0    Diclofenac Sodium (VOLTAREN PO) Take 75 mg by mouth 2 times daily      cetirizine (ZYRTEC) 10 MG tablet Take 10 mg by mouth daily      Multiple Vitamins-Minerals (CENTRUM SILVER) TABS Take 1 tablet by mouth daily      Blood Glucose Monitoring Suppl (PRODIGY AUTOCODE BLOOD GLUCOSE) w/Device KIT Use as directed to test blood sugar daily      albuterol sulfate HFA (VENTOLIN HFA) 108 (90 Base) MCG/ACT inhaler Inhale 2 puffs into the lungs every 6 hours as needed for Wheezing      metFORMIN (GLUCOPHAGE-XR) 500 MG extended release tablet Take 500 mg by mouth Daily with supper       fluticasone (FLONASE) 50 MCG/ACT nasal spray 1 spray by Each Nostril route daily      atorvastatin (LIPITOR) 20 MG tablet Take 20 mg by mouth daily      omeprazole (PRILOSEC) 40 MG delayed release capsule Take 40 mg by mouth daily      budesonide-formoterol (SYMBICORT) 160-4.5 MCG/ACT AERO Inhale 2 puffs into the lungs 2 times daily      aspirin 81 MG chewable tablet Take 81 mg by mouth daily       No current facility-administered medications for this visit.      Pertinent Labs/Vitals:  Lab Results   Component Value Date    LABA1C 6.9 (H) 12/30/2021    LABA1C 6.1 (H) 10/15/2021    LABA1C 6.1 (H) 07/21/2021     BP Readings from Last 3 Encounters:   10/19/21 (!) 147/91   10/15/21 (!) 140/90   10/19/21 113/66      Vitals 10/19/2021 10/19/2021 SYSTOLIC 830 515   DIASTOLIC 66 63     Vitals 02/20/9696   SYSTOLIC 236   DIASTOLIC 68      Per 39/60/81 OV scanned into media tab 12/17/21:  /78 mmHg    Lab Results   Component Value Date    CREATININE 1.04 12/30/2021      Component      Latest Ref Rng & Units 12/30/2021 10/15/2021 7/21/2021          10:02 AM 11:46 AM  2:33 PM   GFR Non-African American      >60 mL/min >60 >60 >60   GFR African American      >60 mL/min >60 >60 >60     Component      Latest Ref Rng & Units 12/30/2021 2/15/2021          10:02 AM  8:16 AM   Microalb, Ur      <21 mg/L <12 <12   Creatinine, Ur      39.0 - 259.0 mg/dL 107.6 156.8   Microalb/Crt. Ratio      <17 mcg/mg creat Can not be calculated CANNOT BE CALCULATED     Component      Latest Ref Rng & Units 5/13/2020           3:15 PM   Microalb, Ur      <21 mg/L <12   Creatinine, Ur      39.0 - 259.0 mg/dL 89.1   Microalb/Crt. Ratio      <17 mcg/mg creat CANNOT BE CALCULATED       Assessment/Outcomes:   Override - On ACEi/ARB or contraindication(s) Normal blood pressure, urinary albumin-to-creatinine ratio, and eGFR     Mary Blakely, PharmD, [facility]  Department, toll free: [phone]     For Pharmacy Admin Tracking Only     Gap Closed?: Yes    Time Spent (min): 5

## 2022-04-22 ENCOUNTER — HOSPITAL ENCOUNTER (OUTPATIENT)
Dept: PHYSICAL THERAPY | Facility: CLINIC | Age: 67
Setting detail: THERAPIES SERIES
Discharge: HOME | End: 2022-04-22
Payer: COMMERCIAL

## 2022-04-22 PROCEDURE — 97161 PT EVAL LOW COMPLEX 20 MIN: CPT

## 2022-04-22 PROCEDURE — 97110 THERAPEUTIC EXERCISES: CPT

## 2022-04-22 NOTE — CONSULTS
[x] THE [facility] &  Therapy  [facility]   [place]: [phone]  F: [phone]      Physical Therapy Spine Evaluation    Date:  2022  Patient: Cody Main  :   MRN: [id number]  Physician: Dr Nataly Macdonald: Nixon Cuellar (30 v, Perry Stade after 30 required)  Medical Diagnosis: Neck pain  Rehab Codes: M54.2, M62.81 (Muscle Weakness), M62.9 (Disorder of Muscle), M79.1 (Myalgia), Z73.6 (Limitation of ADLs)   Onset Date: 22    Next 's appt. : -      Subjective:   CC/HPI: Pt is a 61 yr old male with RUE shoulder and neck pain, started 22 and has progressively gotten worse. He took Medrol dose pack with little relief. Saw Dr Lova Councilman, sent to PT at this time. No radiology or testing. Went to see Dr Cristin Pulido yesterday, pending MRI this next week and Neurontin. Off work for 2 weeks. His symptoms are worse with work and head down, arms out reached positions. Relief in RUE with cervical SB to the left. PMHx:  bilat BLAYNE   Pt was diagnosed with RTC tear and AC jt OA.    Underwent surgery, Date of Procedure: 2021  RUE Shoulder scope, RTC repair, acromioplasty, distal clavicle excision, bicep tenodesis   [] Unremarkable               [] Refer to full medical chart  In EPIC     Tests: None currently     Medications: [x] Refer to full medical record [] None [] Other:  Allergies:      [x] Refer to full medical record [] None [] Other:    ADL/IADL [x] Previously independent with all [x] Currently independent with all Who currently assists the patient with task     [] Previously independent with all except: [] Currently independent with all except:     Bathing  [] Assist [] Assist     Dress/grooming [] Assist [] Assist     Transfer/mobility [] Assist [] Assist     Feeding [] Assist [] Assist     Toileting [] Assist [] Assist     Driving [] Assist [] Assist     Housekeeping [] Assist [] Assist     Grocery shop/meal prep [] Assist [] Assist Function:  Hand Dominance  [x]? Right  []? Left  Marital Status Lives with wife    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 yes   Location RUE shoulder   Pain Rating currently 5/10   Pain at worse 10/10   Pain at best 5/10   Description of pain Sharp at times , dull ache with extended UE use    Altered Sensation Numbness and tingling in RUE neck and shoulder anterior    What makes it worse UE use, looking down, activity    What makes it better Rest, heat, SB to the left relieves RUE shoulder pain    Symptom progression same   Sleep Impaired            Objective:      STRENGTH    Left Right   C5 Shld Abd 5 4-   Shld Flexion 5 4+   Shld IR 5 5   Shld ER 5 5   C6 Elb Flex 5 5   C7 Elb Ext 5 5                     Cervical ROM Left Right   Flexion munoz 25%     Extension munoz 25%     Rotation  munoz 25% munoz 25%   Sidebend  munoz 50% munoz 25%   Retraction                TESTS (+/-) LEFT RIGHT Not Tested   Cerv. Comp - + []   Cerv.  Distraction - - []   Shanda Terrell - + []       OBSERVATION No Deficit Deficit Not Tested Comments   Posture       Forward Head [] [] []    Rounded Shoulders [] [] []    Kyphosis [] [] []    Lordosis [] [] []    Lateral Shift [] [] []    Scoliosis [] [] []    Iliac Crest [] [] []    PSIS [] [] []    ASIS [] [] []    Genu Valgus [] [] []    Genu Varus [] [] []    Genu Recurvatum [] [] []    Pronation [] [] []    Supination [] [] []    Leg Length Discrp [] [] []    Slumped sitting [] [] []    Palpation [] [] []    Sensation [] [] []    Edema [] [] []    Neurological [] [] []          Flexibility Normal Left tight Right tight   Upper Trap [] [x] [x]   Scalenes [] [x] [x]   L.Scap [] [x] [x]   Pec Minor [] [] [x]       FUNCTION Normal Difficult Unable   Sitting [x] [] []   Standing [x] [] []   Ambulation [x] [] []   Groom/Dress [] [x] [] Lift/Carry [] [x] []   OH reach [] [x] []   Sit to Stand [x] [] []       Functional Test: NDI Score: 50% functionally impaired         Assessment:  Patient would benefit from skilled physical therapy services in order to reduce pain and increase ROM, strength and mobility     Problems:    [x] ? Pain  [x] ? ROM  [x] ? Strength        Goals  MET NOT MET ON-  GOING  Details   Date Addressed:        STG: To be met in 10 treatments           1. ? Pain: Decrease pain levels to 4/10 with ADLs []  []  []      2. ? ROM: Increase flexibility and AROM limitations throughout to equal bilat to reduce difficulty with ADLs []  []  []      3. ? Strength: Increase MMT to 5/5 throughout to ease functional limitations and mobility  []  []  []     4. Independent with Home Exercise Programs []  []  []      []  []  []     Date Addressed:        LTG: To be met in 20 treatments       1. Improve score on assessment tool NDI from 50% impairment to less than 20% impairment  []  []  []     2. Reduce pain levels to 2/10 or less with ADLs/sport []  []  []      []  []  []                 Patient goals: Decrease pain, return to work     Rehab Potential:  [x] Good  [] Fair  [] Poor   Suggested Professional Referral:  [x] No  [] Yes:  Barriers to Goal Achievement[de-identified]  [x] No  [] Yes:  Domestic Concerns:  [x] No  [] Yes:    Pt. Education:  [x] Plans/Goals, Risks/Benefits discussed  [x] Home exercise program  Method of Education: [x] Verbal  [x] Demo  [x] Written  Comprehension of Education:  [x] Verbalizes understanding. [x] Demonstrates understanding. [x] Needs Review. [] Demonstrates/verbalizes understanding of HEP/Ed previously given. Access Code: [email or web address]  URL: [email or web address]  Date: 04/22/2022  Prepared by: Farrah Helms    Exercises  Ulnar Nerve Flossing - 1 x daily - 7 x weekly - 3 sets - 10 reps  Seated Upper Trapezius Stretch - 1 x daily - 7 x weekly - 3 sets - 30 (sec) hold  Seated Levator Scapulae Stretch - 1 x daily - 7 x weekly - 3 sets - 30 (sec) hold  Seated Scapular Retraction - 1 x daily - 7 x weekly - 3 sets - 10 reps  Supine Chin Tuck - 1 x daily - 7 x weekly - 3 sets - 10 reps - 5 sec hold  Seated Passive Cervical Retraction - 1 x daily - 7 x weekly - 3 sets - 10 reps - 5 (sec) hold    Treatment Plan:  [x] Therapeutic Exercise   [] Electrical Stimulation  [x] Manual Therapy     [] Lumbar/Cervical Traction  [x] Neuromuscular Re-education [] Cold/hotpack [] Iontophoresis: 4 mg/mL  [x] Instruction in HEP             Dexamethasone Sodium  [] Gait Training           Phosphate 40-80 mAmin  [x] Vasocompression: Ricardo Christine    [] Other:    []  Medication allergies reviewed for use of    Dexamethasone Sodium Phosphate 4mg/ml     with iontophoresis treatments. Pt is not allergic.     Frequency:  2 x/week for 20 visits      Todays Treatment:    Exercise   Reps/ Time Weight/ Level Comments         Pulleys            Corner Pectoral Stretch       Scapular Retractions    HEP   Supine Chin Tucks   HEP   Seated Chin Tucks    HEP   Upper Trap Stretch    HEP   Levator Scap Stretch    HEP         Prone       Retraction      Depression      HAB      Extension      Scaption             Tband       Rows       Extension       IR      ER              Manual: R UT MFR    Specific Instructions for next treatment: Advance along program as tolerated     Evaluation Complexity:  History (Personal factors, comorbidities) [x] 0 [] 1-2 [] 3+   Exam (limitations, restrictions) [x] 1-2 [] 3 [] 4+   Clinical presentation (progression) [x] Stable [] Evolving  [] Unstable   Decision Making [x] Low [] Moderate [] High    [x] Low Complexity [] Moderate Complexity [] High Complexity       Treatment Charges: Mins Units   [x] Evaluation       [x]  Low       []  Moderate       []  High 25 1   []  Modalities     [x]  Ther Exercise 10 1   []  Manual Therapy     []  Ther Activities     []  Aquatics     []  Vasocompression     []  Other       TOTAL TREATMENT TIME: 40    Time in: 0800      Time out: [address]    Electronically signed by: Farrah Helms PT    Physician Signature:________________________________Date:__________________  By signing above or cosigning this note, I have reviewed this plan of care and certify a need for medically necessary rehabilitation services.      *PLEASE SIGN ABOVE AND FAX BACK ALL PAGES*

## 2022-04-25 ENCOUNTER — HOSPITAL ENCOUNTER (OUTPATIENT)
Dept: PHYSICAL THERAPY | Facility: CLINIC | Age: 67
Setting detail: THERAPIES SERIES
Discharge: HOME | End: 2022-04-25
Payer: COMMERCIAL

## 2022-04-25 PROCEDURE — 97110 THERAPEUTIC EXERCISES: CPT

## 2022-04-25 NOTE — FLOWSHEET NOTE
[x] THE [facility] &  Therapy  [facility]   [place]: [phone]  F: [phone]      Physical Therapy Daily Treatment Note    Date:  2022  Patient Name:  James Gallegos    :    MRN: [id number]  Physician: Dr Neftali Wilson: Jennifer Benavidez (30 v, Salli Daniel after 30 required)  Medical Diagnosis: Neck pain                      Rehab Codes: M54.2, M62.81 (Muscle Weakness), M62.9 (Disorder of Muscle), M79.1 (Myalgia), Z73.6 (Limitation of ADLs)   Onset Date: 22                                        Next 's appt. : -    Visit# / total visits:      Cancels/No Shows:     Subjective:    Pain:  [] Yes  [x] No Location: Neck Pain Rating: (0-10 scale) 0/10  Pain altered Tx:  [x] No  [] Yes  Action:  Comments: Patient notes no pain upon arrival, states still has \"burning\" sensation with prolonged anterior shoulder motion. Objective:     Exercise    Reps/ Time Weight/ Level Comments             Pulleys  3'/3'                 Corner Pectoral Stretch   3x30\"       Scapular Retractions      HEP   Supine Chin Tucks     HEP   Seated Chin Tucks      HEP   Upper Trap Stretch   3x30\"   HEP   Levator Scap Stretch   3x30\"   HEP             Prone          Retraction  10x5\"       Depression  10x5\"       HAB  2x10       Extension  2x10       Scaption   2x10                 Tband          Rows   2x10  Green     Extension   2x10  Green     IR  2x10  Green     ER  2x10  Green                  Manual: R UT MFR vis hypervolt    Treatment Charges: Mins Units   []  Modalities     [x]  Ther Exercise 30 2   []  Manual Therapy     []  Ther Activities     []  Aquatics     []  Vasocompression     []  Other     Total Treatment time 30 2       Assessment: [x] Progressing toward goals. Progressed strength program this date. Patient noted increased \"burning\" with pulleys this date, instructed patient to decrease ROM or stop if symptoms arise.  Also notes increased symptoms with prone scapular contractions. No other issues noted with remainder of program. Cueing needed throughout program for postural awareness with exercises and stretching. [] No change. [] Other:  [x] Patient would continue to benefit from skilled physical therapy services in order to: reduce pain and increase ROM, strength and mobility.          Goals  MET NOT MET ON-  GOING  Details   Date Addressed:            STG: To be met in 10 treatments  ?          1. ? Pain: Decrease pain levels to 4/10 with ADLs []? ?  []??  []??      2. ? ROM: Increase flexibility and AROM limitations throughout to equal bilat to reduce difficulty with ADLs []? ?  []??  []??      3. ? Strength: Increase MMT to 5/5 throughout to ease functional limitations and mobility  []? ?  []??  []??      4. Independent with Home Exercise Programs []? ?  []??  []??        []? ?  []??  []??      Date Addressed:            LTG: To be met in 20 treatments           1. Improve score on assessment tool NDI from 50% impairment to less than 20% impairment  []??  []??  []??      2. Reduce pain levels to 2/10 or less with ADLs/sport []? ?  []??  []??        []? ?  []??  []??                        Patient goals: Decrease pain, return to work     Pt. Education:  [x] Yes  [] No  [x] Reviewed Prior HEP/Ed: Stressed importance of postural awareness and continued stretching. Method of Education: [x] Verbal  [] Demo  [] Written  Comprehension of Education:  [x] Verbalizes understanding. [] Demonstrates understanding. [] Needs review. [x] Demonstrates/verbalizes HEP/Ed previously given. Plan: [x] Continue current frequency toward long and short term goals. [x] Specific Instructions for subsequent treatments: progress HEP within patients tolerance.        Time In: 1025              Time Out: 1135    Electronically signed by:  Belkis Lemon PTA

## 2022-04-27 ENCOUNTER — HOSPITAL ENCOUNTER (OUTPATIENT)
Dept: MRI IMAGING | Facility: CLINIC | Age: 67
Discharge: HOME | End: 2022-04-29
Payer: COMMERCIAL

## 2022-04-27 DIAGNOSIS — M50.30 DEGENERATION OF CERVICAL INTERVERTEBRAL DISC: ICD-10-CM

## 2022-04-27 PROCEDURE — 72141 MRI NECK SPINE W/O DYE: CPT

## 2022-04-29 ENCOUNTER — HOSPITAL ENCOUNTER (OUTPATIENT)
Dept: PHYSICAL THERAPY | Facility: CLINIC | Age: 67
Setting detail: THERAPIES SERIES
Discharge: HOME | End: 2022-04-29
Payer: COMMERCIAL

## 2022-04-29 NOTE — CARE COORDINATION
[x] [facility]  Outpatient Rehabilitation &  Therapy  [facility]   [place]: [phone]  F: [phone]      Physical Therapy Cancel/No Show note    Date: 2022  Patient: eDlbert Mcgowan  : 1955  MRN: [id number]    Cancels/No Shows to date:     For today's appointment patient:    [x]  Cancelled    [] Rescheduled appointment    [] No-show     Reason given by patient:    []  Patient ill    []  Conflicting appointment    [] No transportation      [] Conflict with work    [] No reason given    [] Weather related    [] COVID-19    [x] Other:      Comments: Patient was advised by his doctor to cancel all PT appointments because he is receiving an MRI tomorrow and plans to have ACDF surgery on his neck. All future appointments are cancelled.         [] Next appointment was confirmed    Electronically signed by: Junior Mckee PTA

## 2022-05-02 RX ORDER — SODIUM CHLORIDE, SODIUM LACTATE, POTASSIUM CHLORIDE, CALCIUM CHLORIDE 600; 310; 30; 20 MG/100ML; MG/100ML; MG/100ML; MG/100ML
1000 INJECTION, SOLUTION INTRAVENOUS CONTINUOUS
Status: CANCELLED | OUTPATIENT
Start: 2022-05-02

## 2022-05-05 ENCOUNTER — HOSPITAL ENCOUNTER (OUTPATIENT)
Dept: PREADMISSION TESTING | Age: 67
Discharge: HOME | End: 2022-05-09
Payer: COMMERCIAL

## 2022-05-05 VITALS
BODY MASS INDEX: 31.5 KG/M2 | HEIGHT: 71 IN | TEMPERATURE: 97.6 F | SYSTOLIC BLOOD PRESSURE: 141 MMHG | HEART RATE: 68 BPM | WEIGHT: 225 LBS | RESPIRATION RATE: 14 BRPM | OXYGEN SATURATION: 96 % | DIASTOLIC BLOOD PRESSURE: 88 MMHG

## 2022-05-05 LAB
ANION GAP SERPL CALCULATED.3IONS-SCNC: 10 MMOL/L (ref 9–17)
BUN BLDV-MCNC: 24 MG/DL (ref 8–23)
CHLORIDE BLD-SCNC: 102 MMOL/L (ref 98–107)
CO2: 27 MMOL/L (ref 20–31)
CREAT SERPL-MCNC: 1.06 MG/DL (ref 0.7–1.2)
GFR AFRICAN AMERICAN: >60 ML/MIN
GFR NON-AFRICAN AMERICAN: >60 ML/MIN
GFR SERPL CREATININE-BSD FRML MDRD: ABNORMAL ML/MIN/{1.73_M2}
GLUCOSE BLD-MCNC: 136 MG/DL (ref 70–99)
HCT VFR BLD CALC: 42.5 % (ref 40.7–50.3)
HEMOGLOBIN: 13.2 G/DL (ref 13–17)
MCH RBC QN AUTO: 24.6 PG (ref 25.2–33.5)
MCHC RBC AUTO-ENTMCNC: 31.1 G/DL (ref 28.4–34.8)
MCV RBC AUTO: 79.3 FL (ref 82.6–102.9)
NRBC AUTOMATED: 0 PER 100 WBC
PDW BLD-RTO: 16.4 % (ref 11.8–14.4)
PLATELET # BLD: 236 K/UL (ref 138–453)
PMV BLD AUTO: 10 FL (ref 8.1–13.5)
POTASSIUM SERPL-SCNC: 4.9 MMOL/L (ref 3.7–5.3)
RBC # BLD: 5.36 M/UL (ref 4.21–5.77)
SODIUM BLD-SCNC: 139 MMOL/L (ref 135–144)
WBC # BLD: 6.6 K/UL (ref 3.5–11.3)

## 2022-05-05 PROCEDURE — 84520 ASSAY OF UREA NITROGEN: CPT

## 2022-05-05 PROCEDURE — 93005 ELECTROCARDIOGRAM TRACING: CPT | Performed by: ANESTHESIOLOGY

## 2022-05-05 PROCEDURE — 82565 ASSAY OF CREATININE: CPT

## 2022-05-05 PROCEDURE — 80051 ELECTROLYTE PANEL: CPT

## 2022-05-05 PROCEDURE — 36415 COLL VENOUS BLD VENIPUNCTURE: CPT

## 2022-05-05 PROCEDURE — 82947 ASSAY GLUCOSE BLOOD QUANT: CPT

## 2022-05-05 PROCEDURE — 85027 COMPLETE CBC AUTOMATED: CPT

## 2022-05-05 RX ORDER — GABAPENTIN 100 MG/1
200 CAPSULE ORAL 3 TIMES DAILY
COMMUNITY

## 2022-05-05 RX ORDER — FEXOFENADINE HCL 180 MG/1
180 TABLET ORAL DAILY
COMMUNITY

## 2022-05-05 RX ORDER — MELOXICAM 15 MG/1
15 TABLET ORAL DAILY
Status: ON HOLD | COMMUNITY
Start: 2022-02-19 | End: 2022-05-11 | Stop reason: HOSPADM

## 2022-05-05 ASSESSMENT — PAIN DESCRIPTION - FREQUENCY: FREQUENCY: CONTINUOUS

## 2022-05-05 ASSESSMENT — PAIN DESCRIPTION - ONSET: ONSET: ON-GOING

## 2022-05-05 ASSESSMENT — PAIN DESCRIPTION - PAIN TYPE: TYPE: CHRONIC PAIN

## 2022-05-05 ASSESSMENT — PAIN DESCRIPTION - LOCATION: LOCATION: NECK

## 2022-05-05 ASSESSMENT — PAIN SCALES - GENERAL: PAINLEVEL_OUTOF10: 5

## 2022-05-05 ASSESSMENT — PAIN DESCRIPTION - DESCRIPTORS: DESCRIPTORS: DULL

## 2022-05-05 NOTE — PROGRESS NOTES
Anesthesia Focused Assessment    Hx of anesthesia complications:  PONV, states zofran and scopolamine patch have helped in the past. Also urinary retention follows rotator cuff repair but resolved at home. Family hx of anesthesia complications:  no      Prior + Covid-19 test? no        STOP-BANG Sleep Apnea Questionnaire    SNORE loudly (heard through closed doors)? No  TIRED, fatigued, sleepy during daytime? No  OBSERVED stopping breathing during sleep? No  High blood PRESSURE or being treated? No    BMI over 35? No  AGE over 48? Yes  NECK circumference over 16\"? No  GENDER (male)? Yes             Total 2  High risk 5-8  Intermediate risk 3-4  Low risk 0-2    ----------------------------------------------------------------------------------------------------------------------  RENEE                              No  If yes, machine? DM1                                            No  DM2                   Yes    Coronary Artery Disease      No  HTN         No  Defib/AICD/Pacemaker               No             Renal Failure                   No  If yes, on dialysis           Active smoker? No  Drinks alcohol? Yes, occasional  Illicit drugs? No  Dentition?        benign      Past Medical History:   Diagnosis Date    Arthritis     Asthma     Cervical pain     Chest pain     in 2018 had cardiac work up negative. states cardiology states chest pain was from GERD    Colon polyp     [facility] obstructive [facility]) [facility]     Degeneration of cervical intervertebral disc     Diabetes mellitus ([facility] [address]     type 2    GERD (gastroesophageal reflux disease)     History of stress test 2021    , TCC    Hyperlipidemia     Retention of urine     Under care of team 05/05/2022    PCP: Dr. Juancho Velez, [facility], last visit 12/2021         Patient was evaluated in [facility] & anesthesia guidelines were applied.    NPO guidelines, medication instructions and scheduled arrival time were reviewed with patient.                                                                  Anesthesia contacted:   no    Medical or cardiac clearance ordered: no Caye Schirmer, APRN - CNP   5/5/22  10:22 AM

## 2022-05-05 NOTE — H&P
History and Physical    Pt Name: Magen Haywood  MRN: [id number]  YOB: 1955  Date of evaluation: 5/5/2022  Primary Care Physician: Hafsa Woodson MD    SUBJECTIVE:   History of Chief Complaint:    Magen Haywood is a 61 y.o. male who presents for PAT appointment. Patient reports several weeks ago he began to have neck burning pain with radiculopathy to right shoulder and arm. He reports numbness and tingling to the neck, right shoulder and right arm as well. He denies any symptoms to the left arm. 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 He states he is right hand dominant. Patient reports physical therapy has not helped. Patient has been scheduled for MULTI-LEVEL ANTERIOR CERVICAL DISCECTOMY, FUSION C4-6  Allergies  is allergic to meperidine. Medications  Prior to Admission medications    Medication Sig Start Date End Date Taking? Authorizing Provider   gabapentin (NEURONTIN) 100 MG capsule Take 200 mg by mouth 3 times daily.    Yes Historical Provider, MD   fexofenadine (ALLEGRA ALLERGY) 180 MG tablet Take 180 mg by mouth daily   Yes Historical Provider, MD   meloxicam (MOBIC) 15 MG tablet Take 15 mg by mouth daily 2/19/22   Historical Provider, MD   cetirizine (ZYRTEC) 10 MG tablet Take 10 mg by mouth daily    Historical Provider, MD   Multiple Vitamins-Minerals (CENTRUM SILVER) TABS Take 1 tablet by mouth daily    Historical Provider, MD   Blood Glucose Monitoring Suppl (PRODIGY AUTOCODE BLOOD GLUCOSE) w/Device KIT Use as directed to test blood sugar daily    Historical Provider, MD   albuterol sulfate HFA (VENTOLIN HFA) 108 (90 Base) MCG/ACT inhaler Inhale 2 puffs into the lungs every 6 hours as needed for Wheezing    Historical Provider, MD   metFORMIN (GLUCOPHAGE-XR) 500 MG extended release tablet Take 500 mg by mouth Daily with supper     Historical Provider, MD   fluticasone (FLONASE) 50 MCG/ACT nasal spray 1 spray by Each Nostril route daily    Historical Provider, MD   atorvastatin (LIPITOR) 20 MG tablet Take 20 mg by mouth daily    Historical Provider, MD   omeprazole (PRILOSEC) 40 MG delayed release capsule Take 40 mg by mouth daily    Historical Provider, MD   budesonide-formoterol (SYMBICORT) 160-4.5 MCG/ACT AERO Inhale 2 puffs into the lungs 2 times daily    Historical Provider, MD     Past Medical History    has a past medical history of Arthritis, Asthma, Cervical pain, Chest pain, Colon polyp, COPD (chronic obstructive pulmonary disease) ([facility] [address] Degeneration of cervical intervertebral disc, Diabetes mellitus ([facility] [address] GERD (gastroesophageal reflux disease), History of stress test, Hyperlipidemia, Retention of urine, and Under care of team.  Past Surgical History   has a past surgical history that includes joint replacement; Appendectomy; Colonoscopy; eye surgery; hernia repair; Tonsillectomy; Anterior cruciate ligament repair (Right); Biceps tendon repair (Right, 10/2021); Shoulder arthroscopy (Right, 2021); Cardiac surgery; Knee arthroscopy (Left, 10/19/2021); Endoscopy, colon, diagnostic; and Finger trigger release (Bilateral). Social History   reports that he quit smoking about 22 years ago. His smoking use included cigarettes. He quit after 20.00 years of use. He has never used smokeless tobacco.    reports current alcohol use. reports no history of drug use. Marital Status   Children 2  Occupation surgical assistant  Family History  Family Status   Relation Name Status    Mother          Alz    Father          Lung cancer     family history includes Cancer in his father; Heart Disease in his mother.     Review of Systems:  CONSTITUTIONAL:   negative for fevers, chills, fatigue and malaise    EYES:   negative for double vision, blurred vision and photophobia    HEENT:   negative for tinnitus, epistaxis and sore throat     RESPIRATORY:   negative for cough, shortness of breath, wheezing CARDIOVASCULAR:   negative for chest pain, palpitations, syncope, edema    GASTROINTESTINAL:   negative for nausea, vomiting     GENITOURINARY:   negative for incontinence     MUSCULOSKELETAL:   negative for back pain, reports neck pain with radiculopathy to right shoulder and right arm   NEUROLOGICAL:   Numbness and tingling to neck, right shoulder and right arm  negative for headaches and dizziness     PSYCHIATRIC:   negative for anxiety       OBJECTIVE:   VITALS:  height is 5' 11\" (1.803 m) and weight is 225 lb (102.1 kg). His temporal temperature is 97.6 °F (36.4 °C). His blood pressure is 141/88 (abnormal) and his pulse is 68. His respiration is 14 and oxygen saturation is 96%. CONSTITUTIONAL:alert & oriented x 3, no acute distress. Calm and pleasant. SKIN:  Warm and dry, no rashes to exposed areas of skin. HEAD:  Normocephalic, atraumatic. EYES: PERRL. EOMs intact. Wearing glasses. EARS:  Intact and equal bilaterally. No edema or thickening, without lumps, lesions, or discharge. Hearing grossly WNL. NOSE:  Nares patent. No rhinorrhea   MOUTH/THROAT:  Mucous membranes pink and moist, uvula midline, teeth appear to be intact. NECK:supple, no lymphadenopathy  LUNGS: Respirations even and non-labored. Clear to auscultation bilaterally, no wheezes, rales, or rhonchi. CARDIOVASCULAR: Regular rate and rhythm, no murmurs/rubs/gallops   ABDOMEN: soft, non-tender, rotund, bowel sounds active x 4   EXTREMITIES: No edema to bilateral lower extremities. No varicosities to bilateral lower extremities. NEUROLOGIC: CN II-XII are grossly intact. Gait is smooth, rhythmic and effortless. Testing:   EK2022  Labs pending: drawn 2022   IMPRESSIONS:   Cervical radiculopathy.    PLANS:   MULTI-LEVEL ANTERIOR CERVICAL DISCECTOMY, FUSION M9-2    YUNIEL Cardoso CNP  Electronically signed 2022 at 10:07 AM

## 2022-05-06 LAB
EKG ATRIAL RATE: 57 BPM
EKG P AXIS: 58 DEGREES
EKG P-R INTERVAL: 172 MS
EKG Q-T INTERVAL: 398 MS
EKG QRS DURATION: 88 MS
EKG QTC CALCULATION (BAZETT): 387 MS
EKG R AXIS: 21 DEGREES
EKG T AXIS: 50 DEGREES
EKG VENTRICULAR RATE: 57 BPM

## 2022-05-10 ENCOUNTER — ANESTHESIA EVENT (OUTPATIENT)
Dept: OPERATING ROOM | Age: 67
End: 2022-05-10
Payer: COMMERCIAL

## 2022-05-11 ENCOUNTER — HOSPITAL ENCOUNTER (OUTPATIENT)
Age: 67
Setting detail: OBSERVATION
Discharge: HOME | End: 2022-05-12
Attending: NEUROLOGICAL SURGERY | Admitting: NEUROLOGICAL SURGERY
Payer: COMMERCIAL

## 2022-05-11 ENCOUNTER — APPOINTMENT (OUTPATIENT)
Dept: GENERAL RADIOLOGY | Age: 67
End: 2022-05-11
Attending: NEUROLOGICAL SURGERY
Payer: COMMERCIAL

## 2022-05-11 ENCOUNTER — ANESTHESIA (OUTPATIENT)
Dept: OPERATING ROOM | Age: 67
End: 2022-05-11
Payer: COMMERCIAL

## 2022-05-11 VITALS — OXYGEN SATURATION: 96 % | TEMPERATURE: 97.2 F | SYSTOLIC BLOOD PRESSURE: 175 MMHG | DIASTOLIC BLOOD PRESSURE: 99 MMHG

## 2022-05-11 DIAGNOSIS — G89.18 POSTOPERATIVE PAIN: Primary | ICD-10-CM

## 2022-05-11 PROBLEM — M50.00 CERVICAL DISC DISEASE WITH MYELOPATHY: Status: ACTIVE | Noted: 2022-05-11

## 2022-05-11 LAB
GLUCOSE BLD-MCNC: 145 MG/DL (ref 75–110)
GLUCOSE BLD-MCNC: 162 MG/DL (ref 75–110)
GLUCOSE BLD-MCNC: 199 MG/DL (ref 75–110)

## 2022-05-11 PROCEDURE — 82947 ASSAY GLUCOSE BLOOD QUANT: CPT

## 2022-05-11 PROCEDURE — 2720000010 HC SURG SUPPLY STERILE: Performed by: NEUROLOGICAL SURGERY

## 2022-05-11 PROCEDURE — 2580000003 HC RX 258: Performed by: ANESTHESIOLOGY

## 2022-05-11 PROCEDURE — 2580000003 HC RX 258: Performed by: NEUROLOGICAL SURGERY

## 2022-05-11 PROCEDURE — C1713 ANCHOR/SCREW BN/BN,TIS/BN: HCPCS | Performed by: NEUROLOGICAL SURGERY

## 2022-05-11 PROCEDURE — 3600000014 HC SURGERY LEVEL 4 ADDTL 15MIN: Performed by: NEUROLOGICAL SURGERY

## 2022-05-11 PROCEDURE — 6360000002 HC RX W HCPCS: Performed by: ANESTHESIOLOGY

## 2022-05-11 PROCEDURE — 2500000003 HC RX 250 WO HCPCS: Performed by: NURSE ANESTHETIST, CERTIFIED REGISTERED

## 2022-05-11 PROCEDURE — 6370000000 HC RX 637 (ALT 250 FOR IP): Performed by: NEUROLOGICAL SURGERY

## 2022-05-11 PROCEDURE — 6360000002 HC RX W HCPCS: Performed by: NEUROLOGICAL SURGERY

## 2022-05-11 PROCEDURE — 7100000001 HC PACU RECOVERY - ADDTL 15 MIN: Performed by: NEUROLOGICAL SURGERY

## 2022-05-11 PROCEDURE — 72020 X-RAY EXAM OF SPINE 1 VIEW: CPT

## 2022-05-11 PROCEDURE — 3600000004 HC SURGERY LEVEL 4 BASE: Performed by: NEUROLOGICAL SURGERY

## 2022-05-11 PROCEDURE — 94640 AIRWAY INHALATION TREATMENT: CPT

## 2022-05-11 PROCEDURE — 2780000010 HC IMPLANT OTHER: Performed by: NEUROLOGICAL SURGERY

## 2022-05-11 PROCEDURE — G0378 HOSPITAL OBSERVATION PER HR: HCPCS

## 2022-05-11 PROCEDURE — 2500000003 HC RX 250 WO HCPCS: Performed by: NEUROLOGICAL SURGERY

## 2022-05-11 PROCEDURE — 3700000001 HC ADD 15 MINUTES (ANESTHESIA): Performed by: NEUROLOGICAL SURGERY

## 2022-05-11 PROCEDURE — A4217 STERILE WATER/SALINE, 500 ML: HCPCS | Performed by: NEUROLOGICAL SURGERY

## 2022-05-11 PROCEDURE — 2709999900 HC NON-CHARGEABLE SUPPLY: Performed by: NEUROLOGICAL SURGERY

## 2022-05-11 PROCEDURE — 3700000000 HC ANESTHESIA ATTENDED CARE: Performed by: NEUROLOGICAL SURGERY

## 2022-05-11 PROCEDURE — 6360000002 HC RX W HCPCS: Performed by: NURSE ANESTHETIST, CERTIFIED REGISTERED

## 2022-05-11 PROCEDURE — 7100000040 HC SPAR PHASE II RECOVERY - FIRST 15 MIN: Performed by: NEUROLOGICAL SURGERY

## 2022-05-11 PROCEDURE — 7100000000 HC PACU RECOVERY - FIRST 15 MIN: Performed by: NEUROLOGICAL SURGERY

## 2022-05-11 PROCEDURE — 7100000041 HC SPAR PHASE II RECOVERY - ADDTL 15 MIN: Performed by: NEUROLOGICAL SURGERY

## 2022-05-11 PROCEDURE — C9359 IMPLNT,BON VOID FILLER-PUTTY: HCPCS | Performed by: NEUROLOGICAL SURGERY

## 2022-05-11 DEVICE — IMPLANTABLE DEVICE
Type: IMPLANTABLE DEVICE | Site: SPINE CERVICAL | Status: FUNCTIONAL
Brand: TRINICA®

## 2022-05-11 DEVICE — GRAFT BNE SUB M GRN CA CRBNT PTTY INJ RESRB SIGNAFUSE: Type: IMPLANTABLE DEVICE | Site: SPINE CERVICAL | Status: FUNCTIONAL

## 2022-05-11 DEVICE — IMPLANTABLE DEVICE: Type: IMPLANTABLE DEVICE | Site: SPINE CERVICAL | Status: FUNCTIONAL

## 2022-05-11 DEVICE — IMPLANTABLE DEVICE
Type: IMPLANTABLE DEVICE | Site: SPINE CERVICAL | Status: FUNCTIONAL
Brand: TRINICA® TRINICA®

## 2022-05-11 RX ORDER — PANTOPRAZOLE SODIUM 40 MG/1
40 TABLET, DELAYED RELEASE ORAL
Status: DISCONTINUED | OUTPATIENT
Start: 2022-05-12 | End: 2022-05-12 | Stop reason: HOSPADM

## 2022-05-11 RX ORDER — ACETAMINOPHEN 325 MG/1
650 TABLET ORAL EVERY 6 HOURS
Status: DISCONTINUED | OUTPATIENT
Start: 2022-05-11 | End: 2022-05-12 | Stop reason: HOSPADM

## 2022-05-11 RX ORDER — FENTANYL CITRATE 50 UG/ML
INJECTION, SOLUTION INTRAMUSCULAR; INTRAVENOUS PRN
Status: DISCONTINUED | OUTPATIENT
Start: 2022-05-11 | End: 2022-05-11 | Stop reason: SDUPTHER

## 2022-05-11 RX ORDER — SODIUM CHLORIDE 9 MG/ML
INJECTION, SOLUTION INTRAVENOUS PRN
Status: DISCONTINUED | OUTPATIENT
Start: 2022-05-11 | End: 2022-05-11 | Stop reason: HOSPADM

## 2022-05-11 RX ORDER — SODIUM CHLORIDE 0.9 % (FLUSH) 0.9 %
5-40 SYRINGE (ML) INJECTION PRN
Status: DISCONTINUED | OUTPATIENT
Start: 2022-05-11 | End: 2022-05-11 | Stop reason: HOSPADM

## 2022-05-11 RX ORDER — DEXAMETHASONE SODIUM PHOSPHATE 10 MG/ML
INJECTION INTRAMUSCULAR; INTRAVENOUS PRN
Status: DISCONTINUED | OUTPATIENT
Start: 2022-05-11 | End: 2022-05-11 | Stop reason: SDUPTHER

## 2022-05-11 RX ORDER — OXYCODONE HYDROCHLORIDE AND ACETAMINOPHEN 5; 325 MG/1; MG/1
1 TABLET ORAL EVERY 4 HOURS PRN
Qty: 28 TABLET | Refills: 0 | Status: SHIPPED | OUTPATIENT
Start: 2022-05-11 | End: 2022-05-18

## 2022-05-11 RX ORDER — PROPOFOL 10 MG/ML
INJECTION, EMULSION INTRAVENOUS PRN
Status: DISCONTINUED | OUTPATIENT
Start: 2022-05-11 | End: 2022-05-11 | Stop reason: SDUPTHER

## 2022-05-11 RX ORDER — SODIUM CHLORIDE, SODIUM LACTATE, POTASSIUM CHLORIDE, CALCIUM CHLORIDE 600; 310; 30; 20 MG/100ML; MG/100ML; MG/100ML; MG/100ML
INJECTION, SOLUTION INTRAVENOUS CONTINUOUS
Status: DISCONTINUED | OUTPATIENT
Start: 2022-05-11 | End: 2022-05-11 | Stop reason: HOSPADM

## 2022-05-11 RX ORDER — SODIUM CHLORIDE 0.9 % (FLUSH) 0.9 %
5-40 SYRINGE (ML) INJECTION EVERY 12 HOURS SCHEDULED
Status: DISCONTINUED | OUTPATIENT
Start: 2022-05-11 | End: 2022-05-11 | Stop reason: HOSPADM

## 2022-05-11 RX ORDER — SODIUM CHLORIDE, SODIUM LACTATE, POTASSIUM CHLORIDE, CALCIUM CHLORIDE 600; 310; 30; 20 MG/100ML; MG/100ML; MG/100ML; MG/100ML
1000 INJECTION, SOLUTION INTRAVENOUS CONTINUOUS
Status: DISCONTINUED | OUTPATIENT
Start: 2022-05-11 | End: 2022-05-11 | Stop reason: HOSPADM

## 2022-05-11 RX ORDER — FENTANYL CITRATE 50 UG/ML
50 INJECTION, SOLUTION INTRAMUSCULAR; INTRAVENOUS EVERY 5 MIN PRN
Status: COMPLETED | OUTPATIENT
Start: 2022-05-11 | End: 2022-05-11

## 2022-05-11 RX ORDER — METFORMIN HYDROCHLORIDE 500 MG/1
500 TABLET, EXTENDED RELEASE ORAL
Status: DISCONTINUED | OUTPATIENT
Start: 2022-05-11 | End: 2022-05-12 | Stop reason: HOSPADM

## 2022-05-11 RX ORDER — METHYLENE BLUE 10 MG/ML
INJECTION INTRAVENOUS PRN
Status: DISCONTINUED | OUTPATIENT
Start: 2022-05-11 | End: 2022-05-11 | Stop reason: HOSPADM

## 2022-05-11 RX ORDER — ONDANSETRON 2 MG/ML
4 INJECTION INTRAMUSCULAR; INTRAVENOUS
Status: COMPLETED | OUTPATIENT
Start: 2022-05-11 | End: 2022-05-11

## 2022-05-11 RX ORDER — LIDOCAINE HYDROCHLORIDE AND EPINEPHRINE 10; 10 MG/ML; UG/ML
INJECTION, SOLUTION INFILTRATION; PERINEURAL PRN
Status: DISCONTINUED | OUTPATIENT
Start: 2022-05-11 | End: 2022-05-11 | Stop reason: HOSPADM

## 2022-05-11 RX ORDER — ONDANSETRON 2 MG/ML
INJECTION INTRAMUSCULAR; INTRAVENOUS PRN
Status: DISCONTINUED | OUTPATIENT
Start: 2022-05-11 | End: 2022-05-11 | Stop reason: SDUPTHER

## 2022-05-11 RX ORDER — SODIUM CHLORIDE 0.9 % (FLUSH) 0.9 %
5-40 SYRINGE (ML) INJECTION EVERY 12 HOURS SCHEDULED
Status: DISCONTINUED | OUTPATIENT
Start: 2022-05-11 | End: 2022-05-12 | Stop reason: HOSPADM

## 2022-05-11 RX ORDER — KETAMINE HCL IN NACL, ISO-OSM 100MG/10ML
SYRINGE (ML) INJECTION PRN
Status: DISCONTINUED | OUTPATIENT
Start: 2022-05-11 | End: 2022-05-11 | Stop reason: SDUPTHER

## 2022-05-11 RX ORDER — MORPHINE SULFATE 4 MG/ML
4 INJECTION, SOLUTION INTRAMUSCULAR; INTRAVENOUS
Status: DISCONTINUED | OUTPATIENT
Start: 2022-05-11 | End: 2022-05-12 | Stop reason: HOSPADM

## 2022-05-11 RX ORDER — DEXTROSE MONOHYDRATE 50 MG/ML
100 INJECTION, SOLUTION INTRAVENOUS PRN
Status: DISCONTINUED | OUTPATIENT
Start: 2022-05-11 | End: 2022-05-12 | Stop reason: HOSPADM

## 2022-05-11 RX ORDER — MORPHINE SULFATE 2 MG/ML
2 INJECTION, SOLUTION INTRAMUSCULAR; INTRAVENOUS
Status: DISCONTINUED | OUTPATIENT
Start: 2022-05-11 | End: 2022-05-12 | Stop reason: HOSPADM

## 2022-05-11 RX ORDER — FENTANYL CITRATE 50 UG/ML
50 INJECTION, SOLUTION INTRAMUSCULAR; INTRAVENOUS
Status: DISCONTINUED | OUTPATIENT
Start: 2022-05-11 | End: 2022-05-11 | Stop reason: HOSPADM

## 2022-05-11 RX ORDER — SODIUM CHLORIDE, SODIUM LACTATE, POTASSIUM CHLORIDE, CALCIUM CHLORIDE 600; 310; 30; 20 MG/100ML; MG/100ML; MG/100ML; MG/100ML
INJECTION, SOLUTION INTRAVENOUS CONTINUOUS
Status: DISCONTINUED | OUTPATIENT
Start: 2022-05-11 | End: 2022-05-12 | Stop reason: HOSPADM

## 2022-05-11 RX ORDER — EPHEDRINE SULFATE/0.9% NACL/PF 50 MG/5 ML
SYRINGE (ML) INTRAVENOUS PRN
Status: DISCONTINUED | OUTPATIENT
Start: 2022-05-11 | End: 2022-05-11 | Stop reason: SDUPTHER

## 2022-05-11 RX ORDER — MAGNESIUM HYDROXIDE 1200 MG/15ML
LIQUID ORAL CONTINUOUS PRN
Status: DISCONTINUED | OUTPATIENT
Start: 2022-05-11 | End: 2022-05-11 | Stop reason: HOSPADM

## 2022-05-11 RX ORDER — CETIRIZINE HYDROCHLORIDE 10 MG/1
10 TABLET ORAL DAILY
Status: DISCONTINUED | OUTPATIENT
Start: 2022-05-11 | End: 2022-05-12 | Stop reason: HOSPADM

## 2022-05-11 RX ORDER — MAGNESIUM SULFATE 1 G/100ML
INJECTION INTRAVENOUS PRN
Status: DISCONTINUED | OUTPATIENT
Start: 2022-05-11 | End: 2022-05-11 | Stop reason: SDUPTHER

## 2022-05-11 RX ORDER — SODIUM CHLORIDE 0.9 % (FLUSH) 0.9 %
5-40 SYRINGE (ML) INJECTION PRN
Status: DISCONTINUED | OUTPATIENT
Start: 2022-05-11 | End: 2022-05-12 | Stop reason: HOSPADM

## 2022-05-11 RX ORDER — SODIUM CHLORIDE 9 MG/ML
INJECTION, SOLUTION INTRAVENOUS PRN
Status: DISCONTINUED | OUTPATIENT
Start: 2022-05-11 | End: 2022-05-12 | Stop reason: HOSPADM

## 2022-05-11 RX ORDER — LIDOCAINE HYDROCHLORIDE 10 MG/ML
INJECTION, SOLUTION EPIDURAL; INFILTRATION; INTRACAUDAL; PERINEURAL PRN
Status: DISCONTINUED | OUTPATIENT
Start: 2022-05-11 | End: 2022-05-11 | Stop reason: SDUPTHER

## 2022-05-11 RX ORDER — BUDESONIDE AND FORMOTEROL FUMARATE DIHYDRATE 160; 4.5 UG/1; UG/1
2 AEROSOL RESPIRATORY (INHALATION) 2 TIMES DAILY
Status: DISCONTINUED | OUTPATIENT
Start: 2022-05-11 | End: 2022-05-12 | Stop reason: HOSPADM

## 2022-05-11 RX ORDER — FLUTICASONE PROPIONATE 50 MCG
1 SPRAY, SUSPENSION (ML) NASAL DAILY
Status: DISCONTINUED | OUTPATIENT
Start: 2022-05-11 | End: 2022-05-12 | Stop reason: HOSPADM

## 2022-05-11 RX ORDER — GABAPENTIN 100 MG/1
200 CAPSULE ORAL 3 TIMES DAILY
Status: DISCONTINUED | OUTPATIENT
Start: 2022-05-11 | End: 2022-05-12 | Stop reason: HOSPADM

## 2022-05-11 RX ORDER — MIDAZOLAM HYDROCHLORIDE 2 MG/2ML
1 INJECTION, SOLUTION INTRAMUSCULAR; INTRAVENOUS EVERY 10 MIN PRN
Status: DISCONTINUED | OUTPATIENT
Start: 2022-05-11 | End: 2022-05-11 | Stop reason: HOSPADM

## 2022-05-11 RX ORDER — GLYCOPYRROLATE 1 MG/5 ML
SYRINGE (ML) INTRAVENOUS PRN
Status: DISCONTINUED | OUTPATIENT
Start: 2022-05-11 | End: 2022-05-11 | Stop reason: SDUPTHER

## 2022-05-11 RX ORDER — FENTANYL CITRATE 50 UG/ML
50 INJECTION, SOLUTION INTRAMUSCULAR; INTRAVENOUS ONCE
Status: COMPLETED | OUTPATIENT
Start: 2022-05-11 | End: 2022-05-11

## 2022-05-11 RX ORDER — ROCURONIUM BROMIDE 10 MG/ML
INJECTION, SOLUTION INTRAVENOUS PRN
Status: DISCONTINUED | OUTPATIENT
Start: 2022-05-11 | End: 2022-05-11 | Stop reason: SDUPTHER

## 2022-05-11 RX ORDER — ONDANSETRON 2 MG/ML
4 INJECTION INTRAMUSCULAR; INTRAVENOUS EVERY 6 HOURS PRN
Status: DISCONTINUED | OUTPATIENT
Start: 2022-05-11 | End: 2022-05-12 | Stop reason: HOSPADM

## 2022-05-11 RX ORDER — OXYCODONE HYDROCHLORIDE 5 MG/1
5 TABLET ORAL EVERY 4 HOURS PRN
Status: DISCONTINUED | OUTPATIENT
Start: 2022-05-11 | End: 2022-05-12 | Stop reason: HOSPADM

## 2022-05-11 RX ORDER — FENTANYL CITRATE 50 UG/ML
25 INJECTION, SOLUTION INTRAMUSCULAR; INTRAVENOUS EVERY 5 MIN PRN
Status: COMPLETED | OUTPATIENT
Start: 2022-05-11 | End: 2022-05-11

## 2022-05-11 RX ORDER — SODIUM CHLORIDE 450 MG/100ML
INJECTION, SOLUTION INTRAVENOUS CONTINUOUS
Status: DISCONTINUED | OUTPATIENT
Start: 2022-05-11 | End: 2022-05-11

## 2022-05-11 RX ORDER — CEFAZOLIN SODIUM 2 G/50ML
SOLUTION INTRAVENOUS PRN
Status: DISCONTINUED | OUTPATIENT
Start: 2022-05-11 | End: 2022-05-11 | Stop reason: SDUPTHER

## 2022-05-11 RX ORDER — ALBUTEROL SULFATE 90 UG/1
2 AEROSOL, METERED RESPIRATORY (INHALATION) EVERY 6 HOURS PRN
Status: DISCONTINUED | OUTPATIENT
Start: 2022-05-11 | End: 2022-05-12 | Stop reason: HOSPADM

## 2022-05-11 RX ORDER — ATORVASTATIN CALCIUM 20 MG/1
20 TABLET, FILM COATED ORAL DAILY
Status: DISCONTINUED | OUTPATIENT
Start: 2022-05-11 | End: 2022-05-12 | Stop reason: HOSPADM

## 2022-05-11 RX ORDER — METOPROLOL TARTRATE 5 MG/5ML
INJECTION INTRAVENOUS PRN
Status: DISCONTINUED | OUTPATIENT
Start: 2022-05-11 | End: 2022-05-11 | Stop reason: SDUPTHER

## 2022-05-11 RX ORDER — FENTANYL CITRATE 50 UG/ML
25 INJECTION, SOLUTION INTRAMUSCULAR; INTRAVENOUS
Status: DISCONTINUED | OUTPATIENT
Start: 2022-05-11 | End: 2022-05-11 | Stop reason: HOSPADM

## 2022-05-11 RX ADMIN — ROCURONIUM BROMIDE 50 MG: 10 INJECTION INTRAVENOUS at 08:27

## 2022-05-11 RX ADMIN — FENTANYL CITRATE 25 MCG: 50 INJECTION, SOLUTION INTRAMUSCULAR; INTRAVENOUS at 11:07

## 2022-05-11 RX ADMIN — METFORMIN HYDROCHLORIDE 500 MG: 500 TABLET, EXTENDED RELEASE ORAL at 17:25

## 2022-05-11 RX ADMIN — FENTANYL CITRATE 25 MCG: 50 INJECTION, SOLUTION INTRAMUSCULAR; INTRAVENOUS at 11:12

## 2022-05-11 RX ADMIN — LIDOCAINE HYDROCHLORIDE 50 MG: 10 INJECTION, SOLUTION EPIDURAL; INFILTRATION; INTRACAUDAL; PERINEURAL at 08:27

## 2022-05-11 RX ADMIN — SODIUM CHLORIDE, POTASSIUM CHLORIDE, SODIUM LACTATE AND CALCIUM CHLORIDE: 600; 310; 30; 20 INJECTION, SOLUTION INTRAVENOUS at 21:35

## 2022-05-11 RX ADMIN — FENTANYL CITRATE 50 MCG: 50 INJECTION, SOLUTION INTRAMUSCULAR; INTRAVENOUS at 11:28

## 2022-05-11 RX ADMIN — DEXAMETHASONE SODIUM PHOSPHATE 10 MG: 10 INJECTION INTRAMUSCULAR; INTRAVENOUS at 08:46

## 2022-05-11 RX ADMIN — BUDESONIDE AND FORMOTEROL FUMARATE DIHYDRATE 2 PUFF: 160; 4.5 AEROSOL RESPIRATORY (INHALATION) at 21:10

## 2022-05-11 RX ADMIN — Medication 25 MG: at 08:46

## 2022-05-11 RX ADMIN — SODIUM CHLORIDE, POTASSIUM CHLORIDE, SODIUM LACTATE AND CALCIUM CHLORIDE: 600; 310; 30; 20 INJECTION, SOLUTION INTRAVENOUS at 08:21

## 2022-05-11 RX ADMIN — FENTANYL CITRATE 100 MCG: 50 INJECTION, SOLUTION INTRAMUSCULAR; INTRAVENOUS at 08:27

## 2022-05-11 RX ADMIN — Medication 15 MG: at 09:11

## 2022-05-11 RX ADMIN — HYDROMORPHONE HYDROCHLORIDE 1 MG: 1 INJECTION, SOLUTION INTRAMUSCULAR; INTRAVENOUS; SUBCUTANEOUS at 09:15

## 2022-05-11 RX ADMIN — FENTANYL CITRATE 50 MCG: 50 INJECTION, SOLUTION INTRAMUSCULAR; INTRAVENOUS at 11:23

## 2022-05-11 RX ADMIN — Medication 2000 MG: at 17:20

## 2022-05-11 RX ADMIN — ONDANSETRON 4 MG: 2 INJECTION INTRAMUSCULAR; INTRAVENOUS at 13:30

## 2022-05-11 RX ADMIN — GABAPENTIN 200 MG: 100 CAPSULE ORAL at 17:25

## 2022-05-11 RX ADMIN — SUGAMMADEX 200 MG: 100 INJECTION, SOLUTION INTRAVENOUS at 10:46

## 2022-05-11 RX ADMIN — Medication 0.2 MG: at 08:42

## 2022-05-11 RX ADMIN — ACETAMINOPHEN 650 MG: 325 TABLET ORAL at 16:26

## 2022-05-11 RX ADMIN — ACETAMINOPHEN 650 MG: 325 TABLET ORAL at 21:32

## 2022-05-11 RX ADMIN — GABAPENTIN 200 MG: 100 CAPSULE ORAL at 21:32

## 2022-05-11 RX ADMIN — OXYCODONE HYDROCHLORIDE 5 MG: 5 TABLET ORAL at 20:36

## 2022-05-11 RX ADMIN — SODIUM CHLORIDE, POTASSIUM CHLORIDE, SODIUM LACTATE AND CALCIUM CHLORIDE: 600; 310; 30; 20 INJECTION, SOLUTION INTRAVENOUS at 10:27

## 2022-05-11 RX ADMIN — SODIUM CHLORIDE, PRESERVATIVE FREE 10 ML: 5 INJECTION INTRAVENOUS at 21:33

## 2022-05-11 RX ADMIN — Medication 5 MG: at 08:45

## 2022-05-11 RX ADMIN — ROCURONIUM BROMIDE 10 MG: 10 INJECTION INTRAVENOUS at 09:44

## 2022-05-11 RX ADMIN — CEFAZOLIN SODIUM 2000 MG: 2 SOLUTION INTRAVENOUS at 08:36

## 2022-05-11 RX ADMIN — ROCURONIUM BROMIDE 10 MG: 10 INJECTION INTRAVENOUS at 09:09

## 2022-05-11 RX ADMIN — MIDAZOLAM HYDROCHLORIDE 1 MG: 1 INJECTION, SOLUTION INTRAMUSCULAR; INTRAVENOUS at 08:12

## 2022-05-11 RX ADMIN — PROPOFOL 200 MG: 10 INJECTION, EMULSION INTRAVENOUS at 08:27

## 2022-05-11 RX ADMIN — MAGNESIUM SULFATE HEPTAHYDRATE 1000 MG: 1 INJECTION, SOLUTION INTRAVENOUS at 08:46

## 2022-05-11 RX ADMIN — ONDANSETRON 4 MG: 2 INJECTION INTRAMUSCULAR; INTRAVENOUS at 10:16

## 2022-05-11 RX ADMIN — FENTANYL CITRATE 50 MCG: 50 INJECTION, SOLUTION INTRAMUSCULAR; INTRAVENOUS at 11:33

## 2022-05-11 RX ADMIN — METOPROLOL TARTRATE 1 MG: 1 INJECTION, SOLUTION INTRAVENOUS at 09:17

## 2022-05-11 ASSESSMENT — PAIN SCALES - GENERAL
PAINLEVEL_OUTOF10: 5
PAINLEVEL_OUTOF10: 2
PAINLEVEL_OUTOF10: 8
PAINLEVEL_OUTOF10: 5
PAINLEVEL_OUTOF10: 5
PAINLEVEL_OUTOF10: 2
PAINLEVEL_OUTOF10: 3
PAINLEVEL_OUTOF10: 7
PAINLEVEL_OUTOF10: 8
PAINLEVEL_OUTOF10: 2
PAINLEVEL_OUTOF10: 7
PAINLEVEL_OUTOF10: 5

## 2022-05-11 ASSESSMENT — PULMONARY FUNCTION TESTS
PIF_VALUE: 20
PIF_VALUE: 1
PIF_VALUE: 20
PIF_VALUE: 23
PIF_VALUE: 20
PIF_VALUE: 20
PIF_VALUE: 23
PIF_VALUE: 20
PIF_VALUE: 21
PIF_VALUE: 20
PIF_VALUE: 16
PIF_VALUE: 20
PIF_VALUE: 16
PIF_VALUE: 21
PIF_VALUE: 20
PIF_VALUE: 16
PIF_VALUE: 20
PIF_VALUE: 17
PIF_VALUE: 20
PIF_VALUE: 31
PIF_VALUE: 20
PIF_VALUE: 19
PIF_VALUE: 20
PIF_VALUE: 20
PIF_VALUE: 17
PIF_VALUE: 1
PIF_VALUE: 20
PIF_VALUE: 16
PIF_VALUE: 19
PIF_VALUE: 0
PIF_VALUE: 20
PIF_VALUE: 16
PIF_VALUE: 0
PIF_VALUE: 20
PIF_VALUE: 19
PIF_VALUE: 20
PIF_VALUE: 20
PIF_VALUE: 10
PIF_VALUE: 0
PIF_VALUE: 20
PIF_VALUE: 20
PIF_VALUE: 16
PIF_VALUE: 20
PIF_VALUE: 19
PIF_VALUE: 20
PIF_VALUE: 0
PIF_VALUE: 20
PIF_VALUE: 17
PIF_VALUE: 20
PIF_VALUE: 19
PIF_VALUE: 20
PIF_VALUE: 16
PIF_VALUE: 0
PIF_VALUE: 20
PIF_VALUE: 21
PIF_VALUE: 17
PIF_VALUE: 20
PIF_VALUE: 19
PIF_VALUE: 20
PIF_VALUE: 1
PIF_VALUE: 17
PIF_VALUE: 20
PIF_VALUE: 20
PIF_VALUE: 0
PIF_VALUE: 20
PIF_VALUE: 20
PIF_VALUE: 3
PIF_VALUE: 20
PIF_VALUE: 18
PIF_VALUE: 20
PIF_VALUE: 20
PIF_VALUE: 19
PIF_VALUE: 20
PIF_VALUE: 2

## 2022-05-11 ASSESSMENT — PAIN DESCRIPTION - LOCATION
LOCATION: NECK;THROAT
LOCATION: NECK;SHOULDER
LOCATION: NECK
LOCATION: THROAT
LOCATION: NECK
LOCATION: NECK
LOCATION: THROAT

## 2022-05-11 ASSESSMENT — PAIN DESCRIPTION - ONSET
ONSET: ON-GOING
ONSET: ON-GOING

## 2022-05-11 ASSESSMENT — PAIN DESCRIPTION - PAIN TYPE
TYPE: CHRONIC PAIN
TYPE: ACUTE PAIN

## 2022-05-11 ASSESSMENT — PAIN DESCRIPTION - FREQUENCY
FREQUENCY: CONTINUOUS
FREQUENCY: CONTINUOUS

## 2022-05-11 ASSESSMENT — PAIN DESCRIPTION - DESCRIPTORS
DESCRIPTORS: OTHER (COMMENT)
DESCRIPTORS: ACHING
DESCRIPTORS: ACHING;DULL
DESCRIPTORS: ACHING

## 2022-05-11 ASSESSMENT — PAIN SCALES - WONG BAKER
WONGBAKER_NUMERICALRESPONSE: 0

## 2022-05-11 ASSESSMENT — COPD QUESTIONNAIRES: CAT_SEVERITY: NO INTERVAL CHANGE

## 2022-05-11 NOTE — PROGRESS NOTES
Kristine sent to Dr. Brittney Navarro and updated him on patient status regarding Sp02 levels. Plan to admit per Dr. Brittney Navarro.

## 2022-05-11 NOTE — H&P
Pt Name: Taisha Caldera  MRN: [id number]  [place]: 1955  Date of evaluation: 5/11/2022    I have reviewed the patient's history and physical examination completed in pre-admission testing on 5/5/22. Original H&P copied below. No changes to history or on examination today, unless noted below. none    Giancarlo Silva, APRN - CNP  5/11/22  7:26 AM    History and Physical    Pt Name: Taisha Caldera  MRN: [id number]  YOB: 1955  Date of evaluation: 5/5/2022  Primary Care Physician: Adina Conroy MD    SUBJECTIVE:   History of Chief Complaint:    Taisha Caldera is a 61 y.o. male who presents for PAT appointment. Patient reports several weeks ago he began to have neck burning pain with radiculopathy to right shoulder and arm. He reports numbness and tingling to the neck, right shoulder and right arm as well. He denies any symptoms to the left arm. 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 He states he is right hand dominant. Patient reports physical therapy has not helped. Patient has been scheduled for MULTI-LEVEL ANTERIOR CERVICAL DISCECTOMY, FUSION C4-6  Allergies  is allergic to meperidine. Medications  Prior to Admission medications    Medication Sig Start Date End Date Taking? Authorizing Provider   gabapentin (NEURONTIN) 100 MG capsule Take 200 mg by mouth 3 times daily.    Yes Historical Provider, MD   fexofenadine (ALLEGRA ALLERGY) 180 MG tablet Take 180 mg by mouth daily   Yes Historical Provider, MD   meloxicam (MOBIC) 15 MG tablet Take 15 mg by mouth daily 2/19/22   Historical Provider, MD   cetirizine (ZYRTEC) 10 MG tablet Take 10 mg by mouth daily    Historical Provider, MD   Multiple Vitamins-Minerals (CENTRUM SILVER) TABS Take 1 tablet by mouth daily    Historical Provider, MD   Blood Glucose Monitoring Suppl (PRODIGY AUTOCODE BLOOD GLUCOSE) w/Device KIT Use as directed to test blood sugar daily    Historical Provider, MD albuterol sulfate HFA (VENTOLIN HFA) 108 (90 Base) MCG/ACT inhaler Inhale 2 puffs into the lungs every 6 hours as needed for Wheezing    Historical Provider, MD   metFORMIN (GLUCOPHAGE-XR) 500 MG extended release tablet Take 500 mg by mouth Daily with supper     Historical Provider, MD   fluticasone (FLONASE) 50 MCG/ACT nasal spray 1 spray by Each Nostril route daily    Historical Provider, MD   atorvastatin (LIPITOR) 20 MG tablet Take 20 mg by mouth daily    Historical Provider, MD   omeprazole (PRILOSEC) 40 MG delayed release capsule Take 40 mg by mouth daily    Historical Provider, MD   budesonide-formoterol (SYMBICORT) 160-4.5 MCG/ACT AERO Inhale 2 puffs into the lungs 2 times daily    Historical Provider, MD     Past Medical History    has a past medical history of Arthritis, Asthma, Cervical pain, Chest pain, Colon polyp, COPD (chronic obstructive pulmonary disease) ([facility] [address] Degeneration of cervical intervertebral disc, Diabetes mellitus ([facility] [address] GERD (gastroesophageal reflux disease), History of stress test, Hyperlipidemia, Retention of urine, and Under care of team.  Past Surgical History   has a past surgical history that includes joint replacement; Appendectomy; Colonoscopy; eye surgery; hernia repair; Tonsillectomy; Anterior cruciate ligament repair (Right); Biceps tendon repair (Right, 10/2021); Shoulder arthroscopy (Right, 2021); Cardiac surgery; Knee arthroscopy (Left, 10/19/2021); Endoscopy, colon, diagnostic; and Finger trigger release (Bilateral). Social History   reports that he quit smoking about 22 years ago. His smoking use included cigarettes. He quit after 20.00 years of use. He has never used smokeless tobacco.    reports current alcohol use. reports no history of drug use.    Marital Status   Children 2  Occupation surgical assistant  Family History  Family Status   Relation Name Status    Mother          Alz    Father          Lung cancer     family history includes Cancer in his father; Heart Disease in his mother. Review of Systems:  CONSTITUTIONAL:   negative for fevers, chills, fatigue and malaise    EYES:   negative for double vision, blurred vision and photophobia    HEENT:   negative for tinnitus, epistaxis and sore throat     RESPIRATORY:   negative for cough, shortness of breath, wheezing     CARDIOVASCULAR:   negative for chest pain, palpitations, syncope, edema    GASTROINTESTINAL:   negative for nausea, vomiting     GENITOURINARY:   negative for incontinence     MUSCULOSKELETAL:   negative for back pain, reports neck pain with radiculopathy to right shoulder and right arm   NEUROLOGICAL:   Numbness and tingling to neck, right shoulder and right arm  negative for headaches and dizziness     PSYCHIATRIC:   negative for anxiety       OBJECTIVE:   VITALS:  height is 5' 11\" (1.803 m) and weight is 225 lb (102.1 kg). His temporal temperature is 97.6 °F (36.4 °C). His blood pressure is 141/88 (abnormal) and his pulse is 68. His respiration is 14 and oxygen saturation is 96%. CONSTITUTIONAL:alert & oriented x 3, no acute distress. Calm and pleasant. SKIN:  Warm and dry, no rashes to exposed areas of skin. HEAD:  Normocephalic, atraumatic. EYES: PERRL. EOMs intact. Wearing glasses. EARS:  Intact and equal bilaterally. No edema or thickening, without lumps, lesions, or discharge. Hearing grossly WNL. NOSE:  Nares patent. No rhinorrhea   MOUTH/THROAT:  Mucous membranes pink and moist, uvula midline, teeth appear to be intact. NECK:supple, no lymphadenopathy  LUNGS: Respirations even and non-labored. Clear to auscultation bilaterally, no wheezes, rales, or rhonchi. CARDIOVASCULAR: Regular rate and rhythm, no murmurs/rubs/gallops   ABDOMEN: soft, non-tender, rotund, bowel sounds active x 4   EXTREMITIES: No edema to bilateral lower extremities. No varicosities to bilateral lower extremities. NEUROLOGIC: CN II-XII are grossly intact.   Gait is smooth, rhythmic and effortless. Testing:   EK2022  Labs pending: drawn 2022   IMPRESSIONS:   Cervical radiculopathy.    PLANS:   MULTI-LEVEL ANTERIOR CERVICAL DISCECTOMY, FUSION V8-6    YUNIEL Virgen CNP  Electronically signed 2022 at 10:07 AM

## 2022-05-11 NOTE — OP NOTE
Operative Note      Patient: Axel Wan  YOB: 1955  MRN: [id number]    Date of Procedure: 5/11/2022    Pre-Op Diagnosis: CERVICAL PAIN    Post-Op Diagnosis: Cervical radiculopathy with cervical spondylosis C5-6 C6-7       Procedure(s):  MULTI-LEVEL ANTERIOR CERVICAL DISCECTOMY, FUSION C5-7    Surgeon(s):  Buzz Novoa MD    Assistant:   First Assistant: Sree Gore RN    Anesthesia: General    Estimated Blood Loss (mL): Minimal    Complications: None    Specimens:   * No specimens in log *    Implants:  Implant Name Type Inv. Item Serial No.  Lot No. LRB No. Used Action   GRAFT BNE SUB M GRN CA CRBNT PTTY INJ RESRB SIGNAFUSE - [id number]  GRAFT BNE SUB M GRN CA CRBNT PTTY INJ RESRB SIGNAFUSE  [id number] N/A 1 Implanted   PLATE SPNL [id number] 2 LEV STD TI ALLOY ANTR CERV TRINICA STACEY - [id number]  PLATE SPNL S95IF 2 LEV STD TI ALLOY ANTR CERV TRINICA STACEY  [employer]WD  N/A 1 Implanted   SCREW SPNL L14MM DIA4. 2MM GRN SLV TI SELF DRL MARY ANG FULL - [id number]  SCREW SPNL L14MM DIA4. 2MM GRN SLV TI SELF DRL MARY ANG FULL  NU [employer]-WD  N/A 6 Implanted   SPACER SPNL [id number] 5DEG PEEK OPTMA ANTR CERV LORDTC LO - [id number]  SPACER SPNL [id number] 5DEG PEEK OPTMA ANTR CERV LORDTC LO  NU SPINE[employer]WD  N/A 1 Implanted   SPACER SPNL [id number] 5DEG PEEK OPTMA ANTR CERV LORDTC LO - [id number]  SPACER SPNL [id number] 5DEG PEEK OPTMA ANTR CERV LORDTC LO  NU SPINE-WD  N/A 1 Implanted         Drains: * No LDAs found *    Findings: There are large spondylotic spurs at both C5-6 and C6-7    Detailed Description of Procedure: Indication for surgery: Radicular arm pain in response to conservative therapy MRI shows cervical spondylosis at C5-6 and C6-7    Scription of operation: After adequate level general trach anesthesia obtained the patient was prepared and draped in usual sterile fashion.   Transverse incision made the neck and carried down to the platysma muscle which was then opened and a plane created superficial and deep to the platysma muscle. Tracheoesophageal bundle was then retracted toward the left side the precervical fascia was identified and opened intraoperative radiographs were obtained in order to confirm the appropriate levels. Longus coli muscle subperiosteal elevated from the bodies of C5-6 and 7. Per my tractor was then placed mini fluoroscopic musculature and [place] distractor posterior placed in the bodies of C5-6 and 7. Attention was turned first to the C6-7 level there is a large anterior osteophyte which was drilled off and remove the disc base was entered cartilage endplates were removed there were large posterior osteophytes bilaterally which were drilled off and removed. Posterior longitudinal ligament was then opened and the canal was then completely decompressed. The endplates then prepared the  Is parallel with the other. Attention was then turned to the C5-6 level where here to the anterior logical exam was opened and the space was entered disc and cartilage endplates removed spondylotic spurs drilled off removed well out of the uncovertebral joints. There is also a large spondylotic spur here as well the posterior longitudinal ligament was then again opened the endplates prepared such that 1 is parallel with the other. Lax cages were then filled with [employer] fused placed interspace and countersunk a length plate was attached locking mechanism deployed and final radiograph was done showing good position of the cages and plate and screws. Hemostasis was obtained incision closed in layers dressing applied patient waken the operating taken recovery in good condition.     Electronically signed by Sabi Baca MD on 5/11/2022 at 10:50 AM

## 2022-05-11 NOTE — ANESTHESIA POSTPROCEDURE EVALUATION
Department of Anesthesiology  Postprocedure Note    Patient: Severa Filippo  MRN: [id number]  [place]: 1955  Date of evaluation: 5/11/2022  Time:  1:12 PM     Procedure Summary     Date: 05/11/22 Room / Location: [address]    Anesthesia Start: 5901 Anesthesia Stop: 0028    Procedure: MULTI-LEVEL ANTERIOR CERVICAL DISCECTOMY, FUSION C5-7 (N/A Spine Cervical) Diagnosis: (CERVICAL PAIN)    Surgeons: Raj Phipps MD Responsible Provider: Amber Bills MD    Anesthesia Type: general ASA Status: 3          Anesthesia Type: No value filed. Don Phase I: Don Score: 9    Don Phase II:      Last vitals: Reviewed and per EMR flowsheets.        Anesthesia Post Evaluation    Patient location during evaluation: PACU  Patient participation: complete - patient participated  Level of consciousness: awake and alert  Pain score: 3  Airway patency: patent  Nausea & Vomiting: no nausea and no vomiting  Complications: no  Cardiovascular status: hemodynamically stable  Respiratory status: acceptable  Hydration status: stable

## 2022-05-11 NOTE — PROGRESS NOTES
Dr Brambila Or maikol served regarding oxygen saturation down to 74% room air, oxygen reapplied up to 3 liters to maintain sat >92%. Patient also instructed on IS and obtained 1000 ml.

## 2022-05-11 NOTE — PROGRESS NOTES
Dr Cheyenne Montgomery made aware patient oxygen sat down into 70\"s on room air. Patient unable to maintain sat without oxygen. Oxygen applied at 2l per nasal cannula. Cont to monitor.

## 2022-05-11 NOTE — ANESTHESIA PRE PROCEDURE
Department of Anesthesiology  Preprocedure Note       Name:  Taisha Caldera   Age:  61 y.o.  :  1955                                          MRN:  [id number]         Date:  2022      Surgeon: Kennedi Aguirre):  Arie Nino MD    Procedure: Procedure(s):  MULTI-LEVEL ANTERIOR CERVICAL DISCECTOMY, FUSION C4-6  ([place])    Medications prior to admission:   Prior to Admission medications    Medication Sig Start Date End Date Taking? Authorizing Provider   meloxicam (MOBIC) 15 MG tablet Take 15 mg by mouth daily 22   Historical Provider, MD   gabapentin (NEURONTIN) 100 MG capsule Take 200 mg by mouth 3 times daily. Historical Provider, MD   fexofenadine (ALLEGRA ALLERGY) 180 MG tablet Take 180 mg by mouth daily    Historical Provider, MD   cetirizine (ZYRTEC) 10 MG tablet Take 10 mg by mouth daily    Historical Provider, MD   Multiple Vitamins-Minerals (CENTRUM SILVER) TABS Take 1 tablet by mouth daily    Historical Provider, MD   Blood Glucose Monitoring Suppl (PRODIGY AUTOCODE BLOOD GLUCOSE) w/Device KIT Use as directed to test blood sugar daily    Historical Provider, MD   albuterol sulfate HFA (VENTOLIN HFA) 108 (90 Base) MCG/ACT inhaler Inhale 2 puffs into the lungs every 6 hours as needed for Wheezing    Historical Provider, MD   metFORMIN (GLUCOPHAGE-XR) 500 MG extended release tablet Take 500 mg by mouth Daily with supper     Historical Provider, MD   fluticasone (FLONASE) 50 MCG/ACT nasal spray 1 spray by Each Nostril route daily    Historical Provider, MD   atorvastatin (LIPITOR) 20 MG tablet Take 20 mg by mouth daily    Historical Provider, MD   omeprazole (PRILOSEC) 40 MG delayed release capsule Take 40 mg by mouth daily    Historical Provider, MD   budesonide-formoterol (SYMBICORT) 160-4.5 MCG/ACT AERO Inhale 2 puffs into the lungs 2 times daily    Historical Provider, MD       Current medications:    No current facility-administered medications for this encounter.      Current Outpatient Medications   Medication Sig Dispense Refill    meloxicam (MOBIC) 15 MG tablet Take 15 mg by mouth daily      gabapentin (NEURONTIN) 100 MG capsule Take 200 mg by mouth 3 times daily.  fexofenadine (ALLEGRA ALLERGY) 180 MG tablet Take 180 mg by mouth daily      cetirizine (ZYRTEC) 10 MG tablet Take 10 mg by mouth daily      Multiple Vitamins-Minerals (CENTRUM SILVER) TABS Take 1 tablet by mouth daily      Blood Glucose Monitoring Suppl (PRODIGY AUTOCODE BLOOD GLUCOSE) w/Device KIT Use as directed to test blood sugar daily      albuterol sulfate HFA (VENTOLIN HFA) 108 (90 Base) MCG/ACT inhaler Inhale 2 puffs into the lungs every 6 hours as needed for Wheezing      metFORMIN (GLUCOPHAGE-XR) 500 MG extended release tablet Take 500 mg by mouth Daily with supper       fluticasone (FLONASE) 50 MCG/ACT nasal spray 1 spray by Each Nostril route daily      atorvastatin (LIPITOR) 20 MG tablet Take 20 mg by mouth daily      omeprazole (PRILOSEC) 40 MG delayed release capsule Take 40 mg by mouth daily      budesonide-formoterol (SYMBICORT) 160-4.5 MCG/ACT AERO Inhale 2 puffs into the lungs 2 times daily         Allergies: Allergies   Allergen Reactions    Meperidine Other (See Comments)     Urinary retention           Problem List:  There is no problem list on file for this patient. Past Medical History:        Diagnosis Date    Arthritis     Asthma     Cervical pain     Chest pain     in 2018 had cardiac work up negative.   [facility] cardiology states chest pain was from GERD    Colon polyp     COPD (chronic obstructive pulmonary disease) (HCC)     Degeneration of cervical intervertebral disc     Diabetes mellitus ([facility] [address]     type 2    GERD (gastroesophageal reflux disease)     History of stress test 2021    , TCC    Hyperlipidemia     Retention of urine     Under care of team 05/05/2022    PCP: Dr. Adi Hager, [place] herrera, last visit 12/2021       Past Surgical History: Procedure Laterality Date    ANTERIOR CRUCIATE LIGAMENT REPAIR Right     APPENDECTOMY      BICEPS TENDON REPAIR Right 10/2021    CARDIAC SURGERY      cardiac cath negative     COLONOSCOPY      polyp removed negative     ENDOSCOPY, COLON, DIAGNOSTIC      EYE SURGERY      lasik    FINGER TRIGGER RELEASE Bilateral     HERNIA REPAIR      bilat at 17    JOINT REPLACEMENT      bilat hips     KNEE ARTHROSCOPY Left 10/19/2021    LEFT KNEE ARTHROSCOPY WITH PARTIAL MEDIAL MENISCECTOMY performed by Efra Horne MD at [facility] ARTHROSCOPY Right 2021    RIGHT SHOULDER ARTHROSCOPY, ROTATOR CUFF REPAIR, ACROMIOPLASTY, DISTAL CLAVICAL EXCISION, OPEN SUBPEC BICEPS TENODESIS, AND SUPRASCAPULAR NERVE BLOCK performed by Efra Horne MD at [facility] History:    Social History     Tobacco Use    Smoking status: Former Smoker     Years: 20.00     Types: Cigarettes     Quit date:      Years since quittin.3    Smokeless tobacco: Never Used   Substance Use Topics    Alcohol use: Yes     Comment: occasionally                                 Counseling given: Not Answered      Vital Signs (Current): There were no vitals filed for this visit.                                            BP Readings from Last 3 Encounters:   22 (!) 141/88   10/19/21 (!) 147/91   10/15/21 (!) 140/90       NPO Status: Time of last liquid consumption: 2200                        Time of last solid consumption: 2100                                                      BMI:   Wt Readings from Last 3 Encounters:   22 225 lb (102.1 kg)   22 218 lb (98.9 kg)   10/19/21 214 lb 11.2 oz (97.4 kg)     There is no height or weight on file to calculate BMI.    CBC:   Lab Results   Component Value Date    WBC 6.6 2022    RBC 5.36 2022    HGB 13.2 2022    HCT 42.5 2022    MCV 79.3 2022    RDW 16.4 2022     2022       CMP:   Lab Results Component Value Date     05/05/2022    K 4.9 05/05/2022     05/05/2022    CO2 27 05/05/2022    BUN 24 05/05/2022    CREATININE 1.06 05/05/2022    GFRAA >60 05/05/2022    LABGLOM >60 05/05/2022    GLUCOSE 136 05/05/2022    PROT 6.8 12/30/2021    CALCIUM 9.4 12/30/2021    BILITOT 0.28 12/30/2021    ALKPHOS 102 12/30/2021    AST 27 12/30/2021    ALT 41 12/30/2021       POC Tests: No results for input(s): POCGLU, POCNA, POCK, POCCL, POCBUN, POCHEMO, POCHCT in the last 72 hours. Coags: No results found for: PROTIME, INR, APTT    HCG (If Applicable): No results found for: PREGTESTUR, PREGSERUM, HCG, HCGQUANT     ABGs: No results found for: PHART, PO2ART, XAV1MWN, SFK3FFS, BEART, V1WTXKKY     Type & Screen (If Applicable):  No results found for: LABABO, LABRH    Drug/Infectious Status (If Applicable):  No results found for: HIV, HEPCAB    COVID-19 Screening (If Applicable):   Lab Results   Component Value Date    COVID19 Not Detected 12/02/2020           Anesthesia Evaluation  Patient summary reviewed no history of anesthetic complications:   Airway: Mallampati: II  TM distance: >3 FB   Neck ROM: full  Mouth opening: > = 3 FB Dental:          Pulmonary:normal exam    (+) COPD: no interval change,  asthma: seasonal asthma,                            Cardiovascular:Negative CV ROS          ECG reviewed  Rhythm: regular  Rate: normal                    Neuro/Psych:   Negative Neuro/Psych ROS              GI/Hepatic/Renal:   (+) GERD: no interval change,           Endo/Other:    (+) DiabetesType II DM, no interval change, , .                 Abdominal:   (+) obese,           Vascular: negative vascular ROS. Other Findings:             Anesthesia Plan      general     ASA 3       Induction: intravenous. Anesthetic plan and risks discussed with patient. Use of blood products discussed with patient whom consented to blood products. Plan discussed with CRNA.                   Carolina Stewart MD 5/11/2022

## 2022-05-11 NOTE — DISCHARGE INSTR - DIET

## 2022-05-12 VITALS
DIASTOLIC BLOOD PRESSURE: 82 MMHG | SYSTOLIC BLOOD PRESSURE: 126 MMHG | RESPIRATION RATE: 12 BRPM | HEART RATE: 68 BPM | OXYGEN SATURATION: 99 % | TEMPERATURE: 97.5 F

## 2022-05-12 PROCEDURE — 2580000003 HC RX 258: Performed by: NEUROLOGICAL SURGERY

## 2022-05-12 PROCEDURE — 2700000000 HC OXYGEN THERAPY PER DAY

## 2022-05-12 PROCEDURE — G0378 HOSPITAL OBSERVATION PER HR: HCPCS

## 2022-05-12 PROCEDURE — 6370000000 HC RX 637 (ALT 250 FOR IP): Performed by: NEUROLOGICAL SURGERY

## 2022-05-12 PROCEDURE — APPNB15 APP NON BILLABLE TIME 0-15 MINS: Performed by: NURSE PRACTITIONER

## 2022-05-12 PROCEDURE — 94640 AIRWAY INHALATION TREATMENT: CPT

## 2022-05-12 PROCEDURE — 94761 N-INVAS EAR/PLS OXIMETRY MLT: CPT

## 2022-05-12 PROCEDURE — 6360000002 HC RX W HCPCS: Performed by: NEUROLOGICAL SURGERY

## 2022-05-12 PROCEDURE — 2500000003 HC RX 250 WO HCPCS: Performed by: NEUROLOGICAL SURGERY

## 2022-05-12 RX ADMIN — ACETAMINOPHEN 650 MG: 325 TABLET ORAL at 05:16

## 2022-05-12 RX ADMIN — PANTOPRAZOLE SODIUM 40 MG: 40 TABLET, DELAYED RELEASE ORAL at 08:45

## 2022-05-12 RX ADMIN — Medication 2000 MG: at 00:04

## 2022-05-12 RX ADMIN — CETIRIZINE HYDROCHLORIDE 10 MG: 10 TABLET ORAL at 08:44

## 2022-05-12 RX ADMIN — ACETAMINOPHEN 650 MG: 325 TABLET ORAL at 10:30

## 2022-05-12 RX ADMIN — ALBUTEROL SULFATE 2 PUFF: 90 AEROSOL, METERED RESPIRATORY (INHALATION) at 08:05

## 2022-05-12 RX ADMIN — ATORVASTATIN CALCIUM 20 MG: 20 TABLET, FILM COATED ORAL at 08:44

## 2022-05-12 RX ADMIN — BUDESONIDE AND FORMOTEROL FUMARATE DIHYDRATE 2 PUFF: 160; 4.5 AEROSOL RESPIRATORY (INHALATION) at 08:02

## 2022-05-12 RX ADMIN — GABAPENTIN 200 MG: 100 CAPSULE ORAL at 08:45

## 2022-05-12 RX ADMIN — FLUTICASONE PROPIONATE 1 SPRAY: 50 SPRAY, METERED NASAL at 08:45

## 2022-05-12 RX ADMIN — SODIUM CHLORIDE, POTASSIUM CHLORIDE, SODIUM LACTATE AND CALCIUM CHLORIDE: 600; 310; 30; 20 INJECTION, SOLUTION INTRAVENOUS at 05:39

## 2022-05-12 ASSESSMENT — PAIN DESCRIPTION - DESCRIPTORS
DESCRIPTORS: ACHING
DESCRIPTORS: DISCOMFORT

## 2022-05-12 ASSESSMENT — PAIN DESCRIPTION - LOCATION
LOCATION: NECK
LOCATION: NECK;THROAT

## 2022-05-12 ASSESSMENT — PAIN SCALES - GENERAL
PAINLEVEL_OUTOF10: 4
PAINLEVEL_OUTOF10: 5

## 2022-05-12 ASSESSMENT — PAIN DESCRIPTION - ORIENTATION: ORIENTATION: ANTERIOR

## 2022-05-12 NOTE — PROGRESS NOTES
Neurosurgery HERO/Resident    Daily Progress Note   CC:No chief complaint on file. 5/12/2022  6:24 AM    Chart reviewed. No acute events overnight. No new complaints. Resting in bed, tolerating diet reports a little coughing while eating reporting he is eating slow, denies paresthesias in right arm and neck. Urinating without issues and passing flatus.  Ambulating to bathroom, oxygen saturation 96 while on room air     Vitals:    05/12/22 0000 05/12/22 0100 05/12/22 0200 05/12/22 0400   BP: 107/79 101/72 134/81    Pulse: 68 60 96    Resp: 19 19 27    Temp: 98.7 °F (37.1 °C)   98.4 °F (36.9 °C)   TempSrc: Oral   Oral   SpO2: 93% 95% 94%        PE:   AOx3   Motor   L deltoid 5/5; R deltoid 5/5  L biceps 5/5; R biceps 5/5  L triceps 5/5; R triceps 5/5  L wrist extension 5/5; R wrist extension 5/5  L intrinsics 5/5; R intrinsics 5/5      L iliopsoas 5/5 , R iliopsoas 5/5  L quadriceps 5/5; R quadriceps 5/5  L Dorsiflexion 5/5; R dorsiflexion 5/5  L Plantarflexion 5/5; R plantarflexion 5/5  L EHL 5/5; R EHL 5/5    Sensation: intact     Incision: clean dry intact       Lab Results   Component Value Date    WBC 6.6 05/05/2022    HGB 13.2 05/05/2022    HCT 42.5 05/05/2022     05/05/2022    CHOL 292 (H) 12/30/2021    TRIG 340 (H) 12/30/2021    HDL 44 12/30/2021    ALT 41 12/30/2021    AST 27 12/30/2021     05/05/2022    K 4.9 05/05/2022     05/05/2022    CREATININE 1.06 05/05/2022    BUN 24 (H) 05/05/2022    CO2 27 05/05/2022    LABA1C 6.9 (H) 12/30/2021    LABMICR Can not be calculated 12/30/2021         A/P  61 y.o. male who presents with cervical pain, cervical radiculopathy with cervical spondylosis C5-6 and C6-7  POD#1 s/p ACDF C5-7         - Neuro checks per floor protocol  - encourage IS  - ok to be discharged from a neurosurgery standpoint  - spoke with patient regarding continuing Neurontin at this time and to discontinue Mobic     Please contact neurosurgery with any changes in patients neurologic status.        Mike Kemp CNP  5/12/22  6:24 AM

## 2022-05-12 NOTE — CARE COORDINATION
Case Management Initial Discharge Plan  Adiel Dejesus,             Met with:patient to discuss discharge plans. Information verified: address, contacts, phone number, , insurance Yes  Insurance Provider: William Machado     Emergency Contact/Next of Kin name & number:   Yin To     Spouse    [phone]       Who are involved in patient's support system? Patient's wife     PCP: Albertina Gunn MD  Date of last visit: has appointment in July      Discharge Planning    Living Arrangements:        Home has 1 stories  1 stairs to climb to get into front door  Patient able to perform ADL's:Independent    Current Services (outpatient & in home)   DME equipment: n/a    DME provider: n/a     Is patient receiving oral anticoagulation therapy? No    Does patient have any issues/concerns obtaining medications? No  If yes, what are patient's concerns? Is there a preferred Pharmacy after hours or on weekends? Yes    If yes, which pharmacy? [facility] or [facility]     Potential Assistance Needed:       Patient agreeable to home care: No  [place] of choice provided:  n/a    Prior SNF/Rehab Placement and Facility:   Agreeable to SNF/Rehab: No  [place] of choice provided: n/a     Evaluation: n/a    Expected Discharge date:       Patient expects to be discharged to: If home: is the family and/or caregiver wiling & able to provide support at home? yes  Who will be providing this support? wife    Follow Up Appointment: Best Day/ Time:      Transportation provider: wife  Transportation arrangements needed for discharge: Yes    Readmission Risk              Risk of Unplanned Readmission:  0             Does patient have a readmission risk score greater than 14?: No  If yes, follow-up appointment must be made within 7 days of discharge.      Goals of Care:       Educated patient on transitional options, provided freedom of choice and are agreeable with plan      Discharge Plan: home with wife; has transportation. Declined any needs.              Electronically signed by Lni Parikh RN on 5/12/22 at 9:45 AM EDT

## 2022-05-12 NOTE — PROGRESS NOTES
Discussed medication(s) with the patient and all questions fully answered. Educated on when to follow up with PCP. No concerns noted. Patient to be transported home by wife.

## 2022-06-07 ENCOUNTER — TELEPHONE (OUTPATIENT)
Dept: PHARMACY | Facility: CLINIC | Age: 67
End: 2022-06-07

## 2022-06-07 NOTE — TELEPHONE ENCOUNTER
[address] [facility],4Th Floor Employee Diabetes Program    Cinthya Talbert is a 61 y.o. male enrolled in the [facility] Diabetes Program. The goal of this voluntary program is to help employees and covered dependents reach their health maintenance goals in regards to their diabetes diagnosis. According to our records, patient is missing the following requirement(s) that must be completed by July 1, 2022 to avoid discharge from the program:     First diabetes visit with your physician in 2022   First A1c result in 2022      Plan:  Attempt made to reach patient by telephone to review above. Left voice message for patient to return clinician's phone call to [phone], option 3.      Wing Angulo, PharmD, [address] [facility] Pharmacist  Department: [phone]    For Pharmacy Admin Tracking Only     Time Spent (min): 5

## 2022-06-20 ENCOUNTER — HOSPITAL ENCOUNTER (OUTPATIENT)
Facility: CLINIC | Age: 67
Discharge: HOME | End: 2022-06-20
Payer: COMMERCIAL

## 2022-06-20 ENCOUNTER — CLINICAL DOCUMENTATION (OUTPATIENT)
Dept: PHARMACY | Facility: CLINIC | Age: 67
End: 2022-06-20

## 2022-06-20 LAB
ALBUMIN SERPL-MCNC: 4.6 G/DL (ref 3.5–5.2)
ALBUMIN/GLOBULIN RATIO: 1.7 (ref 1–2.5)
ALP BLD-CCNC: 96 U/L (ref 40–129)
ALT SERPL-CCNC: 20 U/L (ref 5–41)
ANION GAP SERPL CALCULATED.3IONS-SCNC: 13 MMOL/L (ref 9–17)
AST SERPL-CCNC: 21 U/L
BILIRUB SERPL-MCNC: 0.28 MG/DL (ref 0.3–1.2)
BUN BLDV-MCNC: 19 MG/DL (ref 8–23)
CALCIUM SERPL-MCNC: 9.4 MG/DL (ref 8.6–10.4)
CHLORIDE BLD-SCNC: 104 MMOL/L (ref 98–107)
CHOLESTEROL/HDL RATIO: 5.2
CHOLESTEROL: 244 MG/DL
CO2: 25 MMOL/L (ref 20–31)
CREAT SERPL-MCNC: 0.95 MG/DL (ref 0.7–1.2)
GFR AFRICAN AMERICAN: >60 ML/MIN
GFR NON-AFRICAN AMERICAN: >60 ML/MIN
GFR SERPL CREATININE-BSD FRML MDRD: ABNORMAL ML/MIN/{1.73_M2}
GLUCOSE BLD-MCNC: 131 MG/DL (ref 70–99)
HDLC SERPL-MCNC: 47 MG/DL
LDL CHOLESTEROL: 159 MG/DL (ref 0–130)
POTASSIUM SERPL-SCNC: 4.5 MMOL/L (ref 3.7–5.3)
PROSTATE SPECIFIC ANTIGEN: 0.76 NG/ML
SODIUM BLD-SCNC: 142 MMOL/L (ref 135–144)
TOTAL PROTEIN: 7.3 G/DL (ref 6.4–8.3)
TRIGL SERPL-MCNC: 189 MG/DL

## 2022-06-20 PROCEDURE — 80053 COMPREHEN METABOLIC PANEL: CPT

## 2022-06-20 PROCEDURE — 83036 HEMOGLOBIN GLYCOSYLATED A1C: CPT

## 2022-06-20 PROCEDURE — 84153 ASSAY OF PSA TOTAL: CPT

## 2022-06-20 PROCEDURE — 82570 ASSAY OF URINE CREATININE: CPT

## 2022-06-20 PROCEDURE — 80061 LIPID PANEL: CPT

## 2022-06-20 PROCEDURE — 36415 COLL VENOUS BLD VENIPUNCTURE: CPT

## 2022-06-20 PROCEDURE — 82043 UR ALBUMIN QUANTITATIVE: CPT

## 2022-06-20 NOTE — PROGRESS NOTES
Pharmacy Pop Care Documentation:     AVS received for required office visit on: 6/20/22: Dr. Adi Hager

## 2022-06-20 NOTE — TELEPHONE ENCOUNTER
Incoming call from patient. Reports that he has an OV today and will have an A1c completed also. He will have all info faxed to us.   Provided with fax #.    Dickie Opitz, PharmD, [facility]  Department, toll free: [phone]

## 2022-06-21 LAB
CREATININE URINE: 108.2 MG/DL (ref 39–259)
ESTIMATED AVERAGE GLUCOSE: 154 MG/DL
HBA1C MFR BLD: 7 % (ref 4–6)
MICROALBUMIN/CREAT 24H UR: 17 MG/L
MICROALBUMIN/CREAT UR-RTO: 16 MCG/MG CREAT

## 2022-12-23 ENCOUNTER — TELEPHONE (OUTPATIENT)
Dept: PHARMACY | Facility: CLINIC | Age: 67
End: 2022-12-23

## 2022-12-23 NOTE — TELEPHONE ENCOUNTER
Patient returned call regarding [facility] DM missing requirements. Patient states he is no longer eligible for our program, being that he retired from [facility] August 2022. Will route to , to update system.

## 2022-12-23 NOTE — TELEPHONE ENCOUNTER
[address] [facility],4Th Floor Employee Diabetes Program    Jenna Griffiths is a 74 y.o. male enrolled in the [place] with Diabetes Program. The goal of this voluntary program is to help employees and covered dependents reach their health maintenance goals in regards to their diabetes diagnosis. According to our records, patient is missing the following requirement(s) that must be completed by December 31, 2022 to avoid discharge from the program:      Second diabetes visit with your provider in 2022  Second A1c result in 2022       Plan:  Attempt made to reach patient by telephone to review above. Left voice message for patient to return clinician's phone call to [phone], option 3. [employer] message sent.       Ana [address]   Direct: [phone]  Phone: toll free [phone]      For Pharmacy Admin Tracking Only    Program: [address]:  No  Gap Closed?: No   Time Spent (min): 5

## 2023-01-26 ENCOUNTER — CLINICAL DOCUMENTATION (OUTPATIENT)
Dept: PHARMACY | Facility: CLINIC | Age: 68
End: 2023-01-26

## 2023-01-26 NOTE — PROGRESS NOTES
Pharmacy Pop Care Documentation:     Konstantin Severino is being removed from the diabetes management program for the following reason(s):  12/23/22: Per patient he retired in 8/2022 - he no longer has benefits    [address] Only    Program: [employer] in place:  No  Gap Closed?: Yes   Time Spent (min): 5

## (undated) DEVICE — SUTURE MCRYL SZ 3-0 L27IN ABSRB UD L26MM SH 1/2 CIR Y416H

## (undated) DEVICE — 3.0MM PRECISION NEURO (MATCH HEAD)

## (undated) DEVICE — AMBIENT SUPER MULTIVAC 50 WITH                                    INTEGRATED FINGER SWITCHES IFS: Brand: COBLATION

## (undated) DEVICE — GLOVE SURG SZ 65 THK91MIL LTX FREE SYN POLYISOPRENE

## (undated) DEVICE — HYPODERMIC SAFETY NEEDLE: Brand: MAGELLAN

## (undated) DEVICE — DRAPE,REIN 53X77,STERILE: Brand: MEDLINE

## (undated) DEVICE — POSITIONER HD W8XH4XL8.5IN RASPBERRY FOAM SLT

## (undated) DEVICE — GARMENT,MEDLINE,DVT,INT,CALF,MED, GEN2: Brand: MEDLINE

## (undated) DEVICE — Device

## (undated) DEVICE — GRASPER SUT 30DEG SHRP TIP LO PROF FOR RAP ACCS IN SHLDR

## (undated) DEVICE — SUTURE V-LOC 180 SZ 3-0 L6IN ABSRB GRN V-20 L26MM 1/2 CIR VLOCL0604

## (undated) DEVICE — DISC SUCT FLR DLX QUICKSUITE

## (undated) DEVICE — ULTRATAPE SUTURE COBRAID BLUE, 6                                    PER BOX: Brand: ULTRATAPE

## (undated) DEVICE — COVER,MAYO STAND,XL,STERILE: Brand: MEDLINE

## (undated) DEVICE — DRAPE MICSCP W117XL305CM DIA65MM LENS W VARI LENS2 FOR LEICA

## (undated) DEVICE — APPLICATOR MEDICATED 26 CC SOLUTION HI LT ORNG CHLORAPREP

## (undated) DEVICE — SUTURE 2-0 STRATAFIX MONOCRYL 45CM CT-1

## (undated) DEVICE — AGGRESSIVE PLUS, ANGLED CUTTER: Brand: FORMULA

## (undated) DEVICE — GLOVE SURG SZ 8 L12IN FNGR THK79MIL GRN LTX FREE

## (undated) DEVICE — CONTAINER,SPECIMEN,OR STERILE,4OZ: Brand: MEDLINE

## (undated) DEVICE — TOWEL,OR,DSP,ST,NATURAL,DLX,4/PK,20PK/CS: Brand: MEDLINE

## (undated) DEVICE — ULTRATAPE 2MM BLUE 38 PKG OF 6

## (undated) DEVICE — APPLICATOR MEDICATED 10.5 CC SOLUTION HI LT ORNG CHLORAPREP

## (undated) DEVICE — BLANKET WRM W40.2XL55.9IN IORT LO BODY + MISTRAL AIR

## (undated) DEVICE — SPONGE,NEURO,0.5"X0.5",XR,STRL,10/PK: Brand: MEDLINE

## (undated) DEVICE — ABS MED DISTRACTION PIN, 14MM PATIENT (INNER): Brand: ABS MED DISTRACTION PIN

## (undated) DEVICE — Z INACTIVE USE 2735373 APPLICATOR FBR LAIN COT WOOD TIP ECONOMICAL

## (undated) DEVICE — SUTURE PDS II SZ 0 L36IN ABSRB VLT L36MM CT-1 1/2 CIR Z346H

## (undated) DEVICE — Z INACTIVE USE 2660664 SOLUTION IRRIG 3000ML 0.9% SOD CHL USP UROMATIC PLAS CONT

## (undated) DEVICE — GAUZE,SPONGE,FLUFF,6"X6.75",STRL,5/TRAY: Brand: MEDLINE

## (undated) DEVICE — CONNECTOR TBNG WHT PLAS SUCT STR 5IN1 LTWT W/ M CONN

## (undated) DEVICE — [FOUR SPIKE IRRIGATOR SET,  NON-PYROGENIC FLUID PATH,  DO NOT USE IF PACKAGE IS DAMAGED]

## (undated) DEVICE — GLOVE SURG SZ 75 CRM LTX FREE POLYISOPRENE POLYMER BEAD ANTI

## (undated) DEVICE — STRIP,CLOSURE,WOUND,MEDI-STRIP,1/2X4: Brand: MEDLINE

## (undated) DEVICE — ELECTRODE PT RET AD L9FT HI MOIST COND ADH HYDRGEL CORDED

## (undated) DEVICE — SYRINGE,CONTROL,LL,FINGER,GRIP: Brand: MEDLINE INDUSTRIES, INC.

## (undated) DEVICE — BLANKET WRM W29.9XL79.1IN UP BODY FORC AIR MISTRAL-AIR

## (undated) DEVICE — DRESSING,GAUZE,XEROFORM,CURAD,1"X8",ST: Brand: CURAD

## (undated) DEVICE — TOWEL,OR,DSP,ST,BLUE,DLX,XR,4/PK,20PK/CS: Brand: MEDLINE

## (undated) DEVICE — DRESSING PETRO W3XL8IN OIL EMUL N ADH GZ KNIT IMPREG CELOS

## (undated) DEVICE — GOWN,PREVENTION PLUS,XLN/XL,ST,24/CS: Brand: MEDLINE

## (undated) DEVICE — CLEAR-TRAC 7.0 MM X 72 MM THREADED                                    CANNULA, WITH DISPOSABLE OBTURATOR,                                    GREY, STERILE: Brand: CLEAR-TRAC

## (undated) DEVICE — TUBING PMP L16FT MAIN DISP FOR AR-6400 AR-6475

## (undated) DEVICE — [AUGER BUR, ARTHROSCOPIC SHAVER BLADE,  DO NOT RESTERILIZE,  DO NOT USE IF PACKAGE IS DAMAGED,  KEEP DRY,  KEEP AWAY FROM SUNLIGHT]: Brand: FORMULA

## (undated) DEVICE — DRESSING BORDERED ADH GZ UNIV GEN USE 5IN 4IN AND 2 1/2IN

## (undated) DEVICE — GOWN,AURORA,NONREINFORCED,LARGE: Brand: MEDLINE

## (undated) DEVICE — YANKAUER,FLEXIBLE HANDLE,REGLR CAPACITY: Brand: MEDLINE INDUSTRIES, INC.

## (undated) DEVICE — 4-PORT MANIFOLD: Brand: NEPTUNE 2

## (undated) DEVICE — GRASPER SUT 60DEG SHRP TIP LO PROF FOR RAP ACCS IN SHLDR

## (undated) DEVICE — PATIENT RETURN ELECTRODE, SINGLE-USE, CONTACT QUALITY MONITORING, ADULT, WITH 9FT CORD, FOR PATIENTS WEIGING OVER 33LBS. (15KG): Brand: MEGADYNE

## (undated) DEVICE — DRAPE,THYROID,SOFT,STERILE: Brand: MEDLINE

## (undated) DEVICE — CONNECTOR,TUBING,5-IN-1,NON-STERILE: Brand: MEDLINE INDUSTRIES, INC.

## (undated) DEVICE — DRAPE,U/ SHT,SPLIT,PLAS,STERIL: Brand: MEDLINE

## (undated) DEVICE — SUTURE NONABSORBABLE MONOFILAMENT 3-0 PS-1 18 IN BLK ETHILON 1663H

## (undated) DEVICE — SYRINGE MED 10ML TRNSLUC BRL PLUNG BLK MRK POLYPR CTRL

## (undated) DEVICE — 3M™ IOBAN™ 2 ANTIMICROBIAL INCISE DRAPE 6650EZ: Brand: IOBAN™ 2

## (undated) DEVICE — 3M™ STERI-STRIP™ COMPOUND BENZOIN TINCTURE 40 BAGS/CARTON 4 CARTONS/CASE C1544: Brand: 3M™ STERI-STRIP™

## (undated) DEVICE — NEEDLE HYPO 25GA L1.5IN BLU POLYPR HUB S STL REG BVL STR

## (undated) DEVICE — SHOULDER SUSPENSION KIT 6 PER BOX

## (undated) DEVICE — AGENT HEMOSTATIC SURGIFLOW MATRIX KIT W/THROMBIN

## (undated) DEVICE — PROTECTOR ULN NRV PUR FOAM HK LOOP STRP ANATOMICALLY

## (undated) DEVICE — SOLUTION IRRIG 3000ML 0.9% SOD CHL USP UROMATIC PLAS CONT

## (undated) DEVICE — ADHESIVE SKIN CLSR 0.7ML TOP DERMBND ADV

## (undated) DEVICE — GLOVE SURG 8 11.7IN BEAD CUF LIGHT BRN SENSICARE LTX FREE

## (undated) DEVICE — BLADE CLP TAPR HD WET DRY CAPABILITY GTT IN CHARGING USE

## (undated) DEVICE — DISPOSABLE KIT FOR 1.8 MM Q-FIX                                    IMPLANT, INCLUDES DRILL, DRILL GUIDE                                    AND OBTURATOR: Brand: Q-FIX

## (undated) DEVICE — SUTURE VCRL SZ 2-0 L36IN ABSRB UD L36MM CT-1 1/2 CIR J945H

## (undated) DEVICE — ELECTRODE ELECSURG NDL 2.8 INX7.2 CM COAT INSUL EDGE

## (undated) DEVICE — TUBING FLD MGMT Y DBL SPIK DUALWAVE

## (undated) DEVICE — INTENDED FOR TISSUE SEPARATION, AND OTHER PROCEDURES THAT REQUIRE A SHARP SURGICAL BLADE TO PUNCTURE OR CUT.: Brand: BARD-PARKER ® CARBON RIB-BACK BLADES

## (undated) DEVICE — [AGGRESSIVE PLUS CUTTER, ARTHROSCOPIC SHAVER BLADE,  DO NOT RESTERILIZE,  DO NOT USE IF PACKAGE IS DAMAGED,  KEEP DRY,  KEEP AWAY FROM SUNLIGHT]: Brand: FORMULA

## (undated) DEVICE — TUBING, SUCTION, 1/4" X 12', STRAIGHT: Brand: MEDLINE

## (undated) DEVICE — SPONGE,PEANUT,XRAY,ST,SM,3/8",5/CARD: Brand: MEDLINE INDUSTRIES, INC.

## (undated) DEVICE — PACK PROCEDURE SURG LUMBAR SPINE SVMMC

## (undated) DEVICE — DISPOSABLE IRRIGATION BIPOLAR CORD, M1000 TYPE: Brand: KIRWAN

## (undated) DEVICE — NEEDLE SPNL 18GA L3.5IN W/ QNCKE SHARPER BVL DURA CLICK

## (undated) DEVICE — CLEAR-TRAC THREADED CANNULA WITH                                    OBTURATOR 5 MM X 76 MM, LATEX FREE,                                    BOX OF 10: Brand: CLEAR-TRAC

## (undated) DEVICE — CONTAINER,SPECIMEN,4OZ,OR STRL: Brand: MEDLINE